# Patient Record
Sex: FEMALE | Race: BLACK OR AFRICAN AMERICAN | Employment: UNEMPLOYED | URBAN - METROPOLITAN AREA
[De-identification: names, ages, dates, MRNs, and addresses within clinical notes are randomized per-mention and may not be internally consistent; named-entity substitution may affect disease eponyms.]

---

## 2017-02-09 ENCOUNTER — CLINICAL SUPPORT (OUTPATIENT)
Dept: FAMILY MEDICINE CLINIC | Age: 47
End: 2017-02-09

## 2017-02-09 DIAGNOSIS — E66.9 OBESITY, UNSPECIFIED OBESITY SEVERITY, UNSPECIFIED OBESITY TYPE: Primary | ICD-10-CM

## 2017-02-09 NOTE — PROGRESS NOTES
Patient attended a Medically Supervised Weight Loss New Patient Orientation today where we discussed:  - New Direction Very Low Calorie Diet details  - Medical Supervision  - Nutrition education  - Cost  - Policies and compliance required for program enrollment. - Lab slip was given to patient with instructions for having them drawn. Patients initial consultation with physician is tentatively scheduled for:  Future Appointments  Date Time Provider Isabella Sapp   2/23/2017 11:00 AM Grace Lewis,  [facility] starting weight was documented as: There were no vitals taken for this visit. [facility]  Metabolic   [facility]  Medically Supervised [facility] Loss Program  [address], [address]  [phone]  office  [phone]  Fax  [email or web address]  [email or web address]

## 2017-02-17 ENCOUNTER — OFFICE VISIT (OUTPATIENT)
Dept: SURGERY | Age: 47
End: 2017-02-17

## 2017-02-17 VITALS
DIASTOLIC BLOOD PRESSURE: 104 MMHG | TEMPERATURE: 97.8 F | SYSTOLIC BLOOD PRESSURE: 163 MMHG | WEIGHT: 281 LBS | BODY MASS INDEX: 56.65 KG/M2 | HEIGHT: 59 IN | HEART RATE: 91 BPM

## 2017-02-17 DIAGNOSIS — K21.9 GERD WITH APNEA: ICD-10-CM

## 2017-02-17 DIAGNOSIS — R06.81 GERD WITH APNEA: ICD-10-CM

## 2017-02-17 DIAGNOSIS — E66.01 MORBID OBESITY DUE TO EXCESS CALORIES (HCC): Primary | ICD-10-CM

## 2017-02-17 RX ORDER — TRIAMTERENE AND HYDROCHLOROTHIAZIDE 37.5; 25 MG/1; MG/1
CAPSULE ORAL DAILY
COMMUNITY
End: 2017-06-15

## 2017-02-17 RX ORDER — HYDROCODONE BITARTRATE AND ACETAMINOPHEN 5; 325 MG/1; MG/1
TABLET ORAL
COMMUNITY
End: 2017-06-15

## 2017-02-17 NOTE — PROGRESS NOTES
Patient seen and examined with Diana Robles PA-C. Patient has super morbid obesity and multiple comorbidities and would benefit from bariatric surgery. Have discussed with patient that given her medical problems and severity of GERD coupled iwht her propensity for eating sweets she would be best served by gastric bypass. She is predominantly interested in sleeve gastrectomy, but is willing to continue to consider her options. She will begin the program and will followup with us for her midtrial at which time she will make her decision.

## 2017-02-17 NOTE — PROGRESS NOTES
Initial Consultation for Bariatric Surgery     Rashi Spears is a 48 y.o. female who comes into the office today for initial consultation for the surgical options for the treatment of morbid obesity. The patient initially identified obesity in elementary school. Rashi Spears has tried a variety of unsupervised weight-loss attempts including [facility] and lost 26 pounds, but has yet to meet with lasting success. Maximum weight lost on a diet is about 30 lbs, but that the weight always seems to return. Today, the patient is Height: 4' 11\" (149.9 cm) , Weight: 127.5 kg (281 lb) for a BMI of Body mass index is 56.76 kg/(m^2). [facility] Sink Maximum weight is 293. It is due to their severe obesity, which is further complicated by weight relate arthropathy, hypertension, GERD and JUSTIN that the patient is now seeking out bariatric surgery. Weight Loss Metrics 2/17/2017 2/17/2017   Pre op / Initial Wt 281 -   Today's Wt - 281 lb   BMI - 56.76 kg/m2   Ideal Body Wt 117 -   Excess Body Wt 164 -   Goal Wt 150 -   Wt loss to date 0 -   % Wt Loss 0 -   80% .2 -       Body mass index is 56.76 kg/(m^2). Past Medical History   Diagnosis Date    Hypertension     Musculoskeletal disorder     Osteoarthritis        Past Surgical History   Procedure Laterality Date    Hx cholecystectomy      Hx gyn         Current Outpatient Prescriptions   Medication Sig Dispense Refill    triamterene-hydroCHLOROthiazide (DYAZIDE) 37.5-25 mg per capsule Take  by mouth daily.  HYDROcodone-acetaminophen (NORCO) 5-325 mg per tablet Take  by mouth.          No Known Allergies    Social History   Substance Use Topics    Smoking status: Former Smoker     Types: Cigarettes     Quit date: 2/17/1978    Smokeless tobacco: Not on file    Alcohol use Yes      Comment: Socially       Family History   Problem Relation Age of Onset    Psychiatric Disorder Mother     Asthma Mother     Heart Disease Mother     Hypertension Mother     Diabetes Father        ROS, positive where in bold:    General: fevers, chills, night sweats, fatigue, weight loss, weight gain    GI: abdominal pain, nausea, vomiting, change in bowel habits, hematochezia, melena, GERD, constipation    Integumentary: dermatitis or abnormal moles.     HEENT:  visual changes, vertigo, epistaxis, dysphagia, hoarseness    Cardiac: chest pain, palpitations, hypertension, edema, varicosities    Resp:  cough, shortness of breath with walking, wheezing, hemoptysis, snoring, reactive airway disease    : hematuria, dysuria, frequency, urgency, nocturia 1x/night, stress urinary incontinence    MSK: weakness, joint pain, osteoarthritis    Endocrine: diabetes, thyroid disease, polyuria, polydipsia, polyphagia, poor wound healing, heat intolerance,cold intolerance    Lymph/Heme: anemia, bruising, history of blood transfusions    Neuro: dizziness, headache, fainting, seizures, stroke    Psychiatry:  anxiety, depression, psychosis      Physical Exam:  Visit Vitals    BP (!) 163/104    Pulse 91    Temp 97.8 °F (36.6 °C)    Ht 4' 11\" (1.499 m)    Wt 127.5 kg (281 lb)    LMP  (Approximate)    BMI 56.76 kg/m2       General: Well developed, well nourished 48 y.o. female in no acute distress  ENMT: normocephalic, atraumatic  Neck: supple, no masses, no adenopathy or carotid bruits, trachea midline  Skin: warm, smooth, dry and well perfused  Respiratory: clear to auscultation bilaterally, no wheeze, rhonchi or rales, excursions normal and symmetrical  Cardiovascular: Regular rate and rhythm, no murmurs, clicks, gallops or rubs, no edema or varicosities  Gastrointestinal: soft, nontender, nondistended, normoactive bowel sounds, no hernias, minimally palpable costal margins, gynecoid distribution  Musculoskeletal: warm, well-perfused, no tenderness or swelling, limping gait  Neuro: sensation and strength grossly intact and symmetrical  Psych: alert and oriented to person, place and time      Impression:    Marie Velasco is a 48 y.o. female who is suffering from morbid obesity and its attendant comorbidities who would benefit from bariatric surgery. We have had an extensive discussion with regard to the risks, benefits and likely outcomes of both the sleeve and the gastric bypass. With regard to bypass, we have discussed the risks including bleeding, infection, need for reoperation, damage to surrounding structure, anastomotic leak, gastrogastric fistula ulcer and stricture, bowel obstruction due to internal hernia or adhesions, DVT, PE, heart attack, stroke and death. Patient also understands risks of inadequate weight loss, excess weight loss, vitamin insufficiency, protein malnutrition, excess skin, and loss of hair. We've discussed the restrictive nature of the sleeve gastrectomy. The patient understands the likelihood of losing approximately 65% of their excess weight in 12 to 18 months. Patient also understands the risks including but not limited to bleeding, infection, need for reoperation, leaks from staple line and strictures, bowel obstruction secondary to adhesions, DVT, PE, heart attack, stroke, and death. Patient also understands risks of inadequate weight loss, excess weight loss, vitamin insufficiency, protein malnutrition, excess skin, and loss of hair. We have reviewed the components of a successful postoperative course including requirement for a high protein, low carbohydrate diet, 60 oz a day of zero calorie liquids, daily vitamin supplementation, daily exercise, regular follow-up, and participation in support groups.  At this time we will enroll the patient in our bariatric program, undertake routine laboratory and radiographic evaluation, EKG, evaluation by nutritionist as well as psychologist.

## 2017-02-23 ENCOUNTER — HOSPITAL ENCOUNTER (OUTPATIENT)
Dept: GENERAL RADIOLOGY | Age: 47
Discharge: HOME | End: 2017-02-23
Attending: PHYSICIAN ASSISTANT
Payer: COMMERCIAL

## 2017-02-23 ENCOUNTER — HOSPITAL ENCOUNTER (OUTPATIENT)
Dept: LAB | Age: 47
Discharge: HOME | End: 2017-02-23
Attending: PHYSICIAN ASSISTANT
Payer: COMMERCIAL

## 2017-02-23 ENCOUNTER — DOCUMENTATION ONLY (OUTPATIENT)
Dept: SURGERY | Age: 47
End: 2017-02-23

## 2017-02-23 ENCOUNTER — HOSPITAL ENCOUNTER (OUTPATIENT)
Dept: OTHER | Age: 47
Discharge: HOME | End: 2017-02-23
Attending: PHYSICIAN ASSISTANT
Payer: COMMERCIAL

## 2017-02-23 ENCOUNTER — HOSPITAL ENCOUNTER (OUTPATIENT)
Dept: PREADMISSION TESTING | Age: 47
Discharge: HOME | End: 2017-02-23
Attending: PHYSICIAN ASSISTANT
Payer: COMMERCIAL

## 2017-02-23 DIAGNOSIS — K21.9 GERD WITH APNEA: ICD-10-CM

## 2017-02-23 DIAGNOSIS — R06.81 GERD WITH APNEA: ICD-10-CM

## 2017-02-23 DIAGNOSIS — E66.01 MORBID OBESITY DUE TO EXCESS CALORIES (HCC): ICD-10-CM

## 2017-02-23 LAB
25(OH)D3 SERPL-MCNC: 16.7 NG/ML (ref 30–100)
ALBUMIN SERPL BCP-MCNC: 4.1 G/DL (ref 3.4–5)
ALBUMIN/GLOB SERPL: 1.1 {RATIO} (ref 0.8–1.7)
ALP SERPL-CCNC: 46 U/L (ref 45–117)
ALT SERPL-CCNC: 38 U/L (ref 13–56)
ANION GAP BLD CALC-SCNC: 8 MMOL/L (ref 3–18)
AST SERPL W P-5'-P-CCNC: 26 U/L (ref 15–37)
ATRIAL RATE: 70 BPM
BASOPHILS # BLD AUTO: 0 K/UL (ref 0–0.06)
BASOPHILS # BLD: 0 % (ref 0–2)
BILIRUB SERPL-MCNC: 0.2 MG/DL (ref 0.2–1)
BUN SERPL-MCNC: 18 MG/DL (ref 7–18)
BUN/CREAT SERPL: 20 (ref 12–20)
CALCIUM SERPL-MCNC: 9 MG/DL (ref 8.5–10.1)
CALCULATED P AXIS, ECG09: 60 DEGREES
CALCULATED R AXIS, ECG10: 96 DEGREES
CALCULATED T AXIS, ECG11: 34 DEGREES
CHLORIDE SERPL-SCNC: 107 MMOL/L (ref 100–108)
CHOLEST SERPL-MCNC: 167 MG/DL
CO2 SERPL-SCNC: 26 MMOL/L (ref 21–32)
CREAT SERPL-MCNC: 0.89 MG/DL (ref 0.6–1.3)
DIAGNOSIS, 93000: NORMAL
DIFFERENTIAL METHOD BLD: ABNORMAL
EOSINOPHIL # BLD: 0 K/UL (ref 0–0.4)
EOSINOPHIL NFR BLD: 0 % (ref 0–5)
ERYTHROCYTE [DISTWIDTH] IN BLOOD BY AUTOMATED COUNT: 13.6 % (ref 11.6–14.5)
FERRITIN SERPL-MCNC: 116 NG/ML (ref 8–388)
FOLATE SERPL-MCNC: 17.1 NG/ML (ref 3.1–17.5)
GLOBULIN SER CALC-MCNC: 3.8 G/DL (ref 2–4)
GLUCOSE SERPL-MCNC: 92 MG/DL (ref 74–99)
HBA1C MFR BLD: 6 % (ref 4.2–5.6)
HCT VFR BLD AUTO: 38.8 % (ref 35–45)
HDLC SERPL-MCNC: 58 MG/DL (ref 40–60)
HDLC SERPL: 2.9 {RATIO} (ref 0–5)
HGB BLD-MCNC: 12.7 G/DL (ref 12–16)
IRON SERPL-MCNC: 60 UG/DL (ref 50–175)
LDLC SERPL CALC-MCNC: 87 MG/DL (ref 0–100)
LIPID PROFILE,FLP: NORMAL
LYMPHOCYTES # BLD AUTO: 54 % (ref 21–52)
LYMPHOCYTES # BLD: 3.3 K/UL (ref 0.9–3.6)
MCH RBC QN AUTO: 30.8 PG (ref 24–34)
MCHC RBC AUTO-ENTMCNC: 32.7 G/DL (ref 31–37)
MCV RBC AUTO: 93.9 FL (ref 74–97)
MONOCYTES # BLD: 0.3 K/UL (ref 0.05–1.2)
MONOCYTES NFR BLD AUTO: 6 % (ref 3–10)
NEUTS SEG # BLD: 2.5 K/UL (ref 1.8–8)
NEUTS SEG NFR BLD AUTO: 40 % (ref 40–73)
P-R INTERVAL, ECG05: 132 MS
PLATELET # BLD AUTO: 215 K/UL (ref 135–420)
PMV BLD AUTO: 11.2 FL (ref 9.2–11.8)
POTASSIUM SERPL-SCNC: 3.7 MMOL/L (ref 3.5–5.5)
PROT SERPL-MCNC: 7.9 G/DL (ref 6.4–8.2)
Q-T INTERVAL, ECG07: 378 MS
QRS DURATION, ECG06: 68 MS
QTC CALCULATION (BEZET), ECG08: 408 MS
RBC # BLD AUTO: 4.13 M/UL (ref 4.2–5.3)
SODIUM SERPL-SCNC: 141 MMOL/L (ref 136–145)
TRIGL SERPL-MCNC: 110 MG/DL (ref ?–150)
TSH SERPL DL<=0.05 MIU/L-ACNC: 1.54 UIU/ML (ref 0.36–3.74)
VENTRICULAR RATE, ECG03: 70 BPM
VIT B12 SERPL-MCNC: 738 PG/ML (ref 211–911)
VLDLC SERPL CALC-MCNC: 22 MG/DL
WBC # BLD AUTO: 6.1 K/UL (ref 4.6–13.2)

## 2017-02-23 PROCEDURE — 74241 XR UPPER GI SERIES W KUB: CPT

## 2017-02-23 PROCEDURE — 80061 LIPID PANEL: CPT | Performed by: PHYSICIAN ASSISTANT

## 2017-02-23 PROCEDURE — 80053 COMPREHEN METABOLIC PANEL: CPT | Performed by: PHYSICIAN ASSISTANT

## 2017-02-23 PROCEDURE — 86677 HELICOBACTER PYLORI ANTIBODY: CPT | Performed by: PHYSICIAN ASSISTANT

## 2017-02-23 PROCEDURE — 93005 ELECTROCARDIOGRAM TRACING: CPT

## 2017-02-23 PROCEDURE — 85025 COMPLETE CBC W/AUTO DIFF WBC: CPT | Performed by: PHYSICIAN ASSISTANT

## 2017-02-23 PROCEDURE — 74011000250 HC RX REV CODE- 250: Performed by: PHYSICIAN ASSISTANT

## 2017-02-23 PROCEDURE — 82728 ASSAY OF FERRITIN: CPT | Performed by: PHYSICIAN ASSISTANT

## 2017-02-23 PROCEDURE — 74011000255 HC RX REV CODE- 255: Performed by: PHYSICIAN ASSISTANT

## 2017-02-23 PROCEDURE — 71020 XR CHEST PA LAT: CPT

## 2017-02-23 PROCEDURE — 82306 VITAMIN D 25 HYDROXY: CPT | Performed by: PHYSICIAN ASSISTANT

## 2017-02-23 PROCEDURE — 84443 ASSAY THYROID STIM HORMONE: CPT | Performed by: PHYSICIAN ASSISTANT

## 2017-02-23 PROCEDURE — 83540 ASSAY OF IRON: CPT | Performed by: PHYSICIAN ASSISTANT

## 2017-02-23 PROCEDURE — 36415 COLL VENOUS BLD VENIPUNCTURE: CPT | Performed by: PHYSICIAN ASSISTANT

## 2017-02-23 PROCEDURE — 84425 ASSAY OF VITAMIN B-1: CPT | Performed by: PHYSICIAN ASSISTANT

## 2017-02-23 PROCEDURE — 83036 HEMOGLOBIN GLYCOSYLATED A1C: CPT | Performed by: PHYSICIAN ASSISTANT

## 2017-02-23 PROCEDURE — 82607 VITAMIN B-12: CPT | Performed by: PHYSICIAN ASSISTANT

## 2017-02-23 RX ADMIN — ANTACID/ANTIFLATULENT 4 G: 380; 550; 10; 10 GRANULE, EFFERVESCENT ORAL at 08:01

## 2017-02-23 RX ADMIN — BARIUM SULFATE 135 ML: 980 POWDER, FOR SUSPENSION ORAL at 08:01

## 2017-02-23 RX ADMIN — BARIUM SULFATE 176 G: 960 POWDER, FOR SUSPENSION ORAL at 08:00

## 2017-02-23 NOTE — PROGRESS NOTES
[facility]  [address] [facility] [address] [facility] [address], [address] [place]    Patient's Name: Tuan Olsen                                  Age: 48 y.o. YOB: 1970                                          Sex: female  Date: 2/23/2017  Insurance: [employer]                          Session: 1 of 4               Surgeon:  Dr. Rad Hernandez    Height: 4'11\"                    Weight: 278#           Starting weight with surgeon: 281#      Overall Pounds Lost: 3#                      Do you smoke?: no    Alcohol Intake:  Number of drinks at a time:  2  Number of times a week: 1    Class Guidelines    Pt. Understand that if they miss a month, depending on insurance, they may have to start over. Pt. Also understand that the expectation is to lose  Or maintain weight. Weight gain may result in delaying surgery until the weight is off. EATING HABITS AND BEHAVIORS:  Pt. Struggles With:  Liquid calories:   Skipping breakfast: x  Eating foods with a high amount of carbohydrates: x  Eating High fat foods: x  Eating large portions: x  Making poor snack choices:x  Eating out frequently:   Skipping meals: x  Reading labels: x  Drinking >48 oz fluids daily: x  Getting sufficient physical activity/exercise: x  Night time eating/snacking: x  Binge eating:   Eating often, even when not hungry (grazing):   Giving into peer pressure regarding eating/drinking: x  Other:     Each class we spend time discussing the pre-op diet, diet progression and vitamin/mineral requirements as well as the Key Diet Principles. Each class we also cover lowering carbohydrate consumption. Keeping carbohydrates <25 grams per meal and avoiding added sugars is emphasized. Patient is educated on the effects that  carbohydrates and bad fats have on them.       PHYSICAL ACTIVITY/EXERCISE:   Patient's activity is increasing because patient plans to or is:  Walking more: x  Other aerobic activity such as running, swimming, Divina, kickboxing ect.:   Chair exercises: x  Active in resistance training such as weight lifting:   Other:     Each class we spend time discussing the importance of increasing activity. Patient can start with 10 minutes of walking daily or chair exercises and build from there. BEHAVIOR MODIFICATION:  Making modifications to behavior, patient plans to or is:  Eating only when hungry not to treat stress, anger, tiredness or boredom: x  Eating away from TV, computer and phone: x  Eating on a small plate: x  Eats slowly, chewing food well: x  Other: We spend time in each class discussing the importance of breaking bad habits and how to do this. I encourage pt.s to self evaluate and look for those crucial moments in which they are making these poor choices. I recommend a food journal which can help identify when/where//why/who we are with when we compromise and make exceptions that are poor choices. ADDITIONAL INFORMATION:  Specific changes patient made since the last class:      RD's concerns/opinion's:  Patient needs to end some poor eating habits such as snacking on chips, drinking soda and night time eating syndrome.     Ricky Pride RD

## 2017-02-24 LAB — H PYLORI IGG SER IA-ACNC: 6.4 U/ML (ref 0–0.8)

## 2017-02-25 LAB — VIT B1 BLD-SCNC: 98.8 NMOL/L (ref 66.5–200)

## 2017-03-02 ENCOUNTER — DOCUMENTATION ONLY (OUTPATIENT)
Dept: SURGERY | Age: 47
End: 2017-03-02

## 2017-03-15 ENCOUNTER — DOCUMENTATION ONLY (OUTPATIENT)
Dept: SURGERY | Age: 47
End: 2017-03-15

## 2017-03-29 ENCOUNTER — DOCUMENTATION ONLY (OUTPATIENT)
Dept: SURGERY | Age: 47
End: 2017-03-29

## 2017-03-29 NOTE — PROGRESS NOTES
[facility]  [address] [facility], [facility]    Patient's Name: Rafa Villalpando                                  Age: 48 y.o. YOB: 1970                                          Sex: female  Date: 3/29/2017  Insurance: [employer]                          Session: 2 of 4               Surgeon:  Dr. Kwabena Larson    Height: 4'11\"                    Weight: 274#           Starting weight with surgeon: 281#      Overall Pounds Lost: 7#                      Do you smoke?: no    Alcohol Intake:     Number of drinks at a time:  2  Number of times a week: 1    Class Guidelines    Pt. Understand that if they miss a month, depending on insurance, they may have to start over. Pt. Also understand that the expectation is to lose  Or maintain weight. Weight gain may result in delaying surgery until the weight is off. EATING HABITS AND BEHAVIORS:  Pt. Struggles With:  Liquid calories:   Skipping breakfast:   Eating foods with a high amount of carbohydrates:   Eating High fat foods:   Eating large portions: x  Making poor snack choices:  Eating out frequently:   Skipping meals:   Reading labels: x  Drinking >48 oz fluids daily:   Getting sufficient physical activity/exercise: x  Night time eating/snacking:   Binge eating:   Eating often, even when not hungry (grazing):   Giving into peer pressure regarding eating/drinking:   Other:     Each class we spend time discussing the pre-op diet, diet progression and vitamin/mineral requirements as well as the Key Diet Principles. Each class we also cover lowering carbohydrate consumption. Keeping carbohydrates <25 grams per meal and avoiding added sugars is emphasized. Patient is educated on the effects that  carbohydrates and bad fats have on them. PHYSICAL ACTIVITY/EXERCISE:   Patient's activity is increasing because patient plans to or is:  Walking more:    Other aerobic activity such as running, swimming, Divina, kickboxing ect.:   Chair exercises: x  Active in resistance training such as weight lifting:   Other:     Each class we spend time discussing the importance of increasing activity. Patient can start with 10 minutes of walking daily or chair exercises and build from there. BEHAVIOR MODIFICATION:  Making modifications to behavior, patient plans to or is:  Eating only when hungry not to treat stress, anger, tiredness or boredom: x  Eating away from TV, computer and phone: x  Eating on a small plate: x  Eats slowly, chewing food well: x  Other: We spend time in each class discussing the importance of breaking bad habits and how to do this. I encourage pt.s to self evaluate and look for those crucial moments in which they are making these poor choices. I recommend a food journal which can help identify when/where//why/who we are with when we compromise and make exceptions that are poor choices. ADDITIONAL INFORMATION:  Specific changes patient made since the last class:  I'm eating more protein less carbs.     Marcello Mosquera, RD

## 2017-04-20 ENCOUNTER — DOCUMENTATION ONLY (OUTPATIENT)
Dept: SURGERY | Age: 47
End: 2017-04-20

## 2017-04-20 ENCOUNTER — OFFICE VISIT (OUTPATIENT)
Dept: SURGERY | Age: 47
End: 2017-04-20

## 2017-04-20 VITALS
HEIGHT: 59 IN | TEMPERATURE: 96.5 F | HEART RATE: 80 BPM | BODY MASS INDEX: 52.62 KG/M2 | DIASTOLIC BLOOD PRESSURE: 70 MMHG | SYSTOLIC BLOOD PRESSURE: 138 MMHG | WEIGHT: 261 LBS

## 2017-04-20 DIAGNOSIS — E66.01 MORBID OBESITY DUE TO EXCESS CALORIES (HCC): Primary | ICD-10-CM

## 2017-04-20 NOTE — PROGRESS NOTES
Bariatric Preoperative Progress note:    Subjective:     Dionne Grayson is a 48 y.o. female who presents today for followup of their candidacy for bariatric surgery. Since last seen, has been working with Hilda Coello. She has been cutting down on carbs, drinking more water and has joined the gym. She has seen 20lb weight loss. Past Medical History:   Diagnosis Date    Hypertension     Musculoskeletal disorder     Osteoarthritis        Past Surgical History:   Procedure Laterality Date    HX CHOLECYSTECTOMY      HX GYN         Current Outpatient Prescriptions   Medication Sig Dispense Refill    triamterene-hydroCHLOROthiazide (DYAZIDE) 37.5-25 mg per capsule Take  by mouth daily.  HYDROcodone-acetaminophen (NORCO) 5-325 mg per tablet Take  by mouth.          No Known Allergies      Objective:     Physical Exam:  Visit Vitals    /70    Pulse 80    Temp 96.5 °F (35.8 °C)    Ht 4' 11\" (1.499 m)    Wt 118.4 kg (261 lb)    LMP  (Approximate)    BMI 52.72 kg/m2       General: Well developed, well nourished 48 y.o. female in no acute distress  ENMT: normocephalic, atraumatic mouth:clear, no overt lesions, oral mucosa pink and moist  Neck: supple, no masses, no adenopathy or carotid bruits, trachea midline  Skin: warm, smooth, dry and well perfused  Respiratory: clear to auscultation bilaterally, no wheezes, rhonchi or rales, excursions normal and symmetrical  Cardiovascular: Regular rate and rhythm, no murmurs, clicks, gallops or rubs, no edema or varicosities  Gastrointestinal: soft, nontender, nondistended, normoactive bowel sounds, no hernias, no hepatosplenomegaly  Musculoskeletal: warm, well-perfused, no tenderness or swelling, normal gait/station  Neuro: sensation and strength grossly intact and symmetrical  Psych: alert and oriented to person, place and time      Studies to date:    Labs: significant for elevated A1c 6.0, H pylori (treated),     EKG: NSR    CXR: no acute process    Nutritional evaluation: ongoing    UGI: negative for hiatal hernia and reflux    Psychiatric evaluation:good cnadidate for weight loss surgery        Assessment:   Sonu Morgan is a 48 y.o. female who is progressing through the bariatric preoperative evaluation. At this time, they are an appropriate candidate for weight loss surgery. Plan:   -complete remainder of preop evaluation including remaining dietary classes  -Follow up once has completed entirety of weight loss workup to determine next steps.

## 2017-04-20 NOTE — PROGRESS NOTES
[facility]  [address] [place]    Patient's Name: Chicho Quiroz                                  Age: 48 y.o. YOB: 1970                                          Sex: female  Date: 4/20/2017  Insurance: [employer]                          Session: 3 of 4               Surgeon:  Dr. Caridad Herron    Height: 4'11\"                    Weight: 261#           Starting weight with surgeon: 274#      Overall Pounds Lost: 13#            Do you smoke?: no    Alcohol Intake:    Number of drinks at a time:  1-2  Number of times a week: 1    Class Guidelines    Pt. Understand that if they miss a month, depending on insurance, they may have to start over. Pt. Also understand that the expectation is to lose  Or maintain weight. Weight gain may result in delaying surgery until the weight is off. EATING HABITS AND BEHAVIORS:  Pt. Struggles With:  Liquid calories:   Skipping breakfast:   Eating foods with a high amount of carbohydrates:   Eating High fat foods:   Eating large portions:   Making poor snack choices:  Eating out frequently:   Skipping meals:   Reading labels:   Drinking >48 oz fluids daily:   Getting sufficient physical activity/exercise:   Night time eating/snacking:   Binge eating:   Eating often, even when not hungry (grazing):   Giving into peer pressure regarding eating/drinking:   Other:     Each class we spend time discussing the pre-op diet, diet progression and vitamin/mineral requirements as well as the Key Diet Principles. Each class we also cover lowering carbohydrate consumption. Keeping carbohydrates <25 grams per meal and avoiding added sugars is emphasized. Patient is educated on the effects that  carbohydrates and bad fats have on them. PHYSICAL ACTIVITY/EXERCISE:   Patient's activity is increasing because patient plans to or is:  Walking more: x  Other aerobic activity such as running, swimming, Divina, kickboxing [facility] x  Chair exercises: x  Active in resistance training such as weight lifting:   Other:     Each class we spend time discussing the importance of increasing activity. Patient can start with 10 minutes of walking daily or chair exercises and build from there. BEHAVIOR MODIFICATION:  Making modifications to behavior, patient plans to or is:  Eating only when hungry not to treat stress, anger, tiredness or boredom: x  Eating away from TV, computer and phone: x  Eating on a small plate: x  Eats slowly, chewing food well: x  Other: We spend time in each class discussing the importance of breaking bad habits and how to do this. I encourage pt.s to self evaluate and look for those crucial moments in which they are making these poor choices. I recommend a food journal which can help identify when/where//why/who we are with when we compromise and make exceptions that are poor choices. ADDITIONAL INFORMATION:  Specific changes patient made since the last class:  I try not to snack in between meals. Especially if I am not hungry. RD's concerns/opinion':  Patient is doing well through out the WLT.     Parth Barrett RD

## 2017-04-20 NOTE — MR AVS SNAPSHOT
Visit Information Date & Time Provider Department Dept. Phone Encounter #  
 4/20/2017  9:30 AM Aleks Milton MD [facility] [phone] [id number] Your Appointments 4/20/2017 10:30 AM  
NUTRITION COUNSELING with [facility] DIETICIAN [address] ([address]) Appt Note: Class 3 of 4  [id number] [facility] [address] [facility] [address] [place] [address]  
  
    
 5/25/2017  1:00 PM  
NUTRITION COUNSELING with [facility] DIETICIAN [address] ([address]) Appt Note: Class 4 of 4 ; Final Eval  
 [address] [facility] [address] [address]  
[phone]  
  
    
 5/25/2017  2:00 PM  
Follow Up with Aleks Milton MD  
[address] ([address]) Appt Note: Completed Insurance Requirements [id number] [facility] [address]  
  
   
 [address] [facility] [address] [facility] Upcoming Health Maintenance Date Due DTaP/Tdap/Td series (1 - Tdap) 4/27/1989 PAP AKA CERVICAL CYTOLOGY 4/27/1989 INFLUENZA AGE 9 TO ADULT 8/1/2016 Allergies as of 4/20/2017  Review Complete On: 2/17/2017 By: Mary Clements LPN No Known Allergies Current Immunizations  Never Reviewed No immunizations on file. Not reviewed this visit You Were Diagnosed With   
  
 Codes Comments Morbid obesity due to excess calories ([facility] [address]    -  Primary ICD-10-CM: E66.01 
ICD-9-CM: 278.01 Vitals BP Pulse Temp Height(growth percentile) Weight(growth percentile) LMP  
 138/70 80 96.5 °F (35.8 °C) 4' 11\" (1.499 m) 261 lb (118.4 kg) (Approximate) BMI OB Status Smoking Status 52.72 kg/m2 Hysterectomy Former Smoker BMI and BSA Data Body Mass Index Body Surface Area 52.72 kg/m 2 2.22 m 2 Your Updated Medication List  
  
   
This list is accurate as of: 4/20/17 10:04 AM.  Always use your most recent med list.  
  
  
  
  
 HYDROcodone-acetaminophen 5-325 mg per tablet Commonly known as:  Risa Cruise Take  by mouth.  
  
 triamterene-hydroCHLOROthiazide 37.5-25 mg per capsule Commonly known as:  Christobal Mote Take  by mouth daily. Introducing [facility]! Rocio Page introduces [employer] patient portal. Now you can access parts of your medical record, email your doctor's office, and request medication refills online. 1. In your internet browser, go to [email or web address] 2. Click on the First Time User? Click Here link in the Sign In box. You will see the New Member Sign Up page. 3. Enter your [employer] Access Code exactly as it appears below. You will not need to use this code after youve completed the sign-up process. If you do not sign up before the expiration date, you must request a new code. · [employer] Access Code: [email or web address] Expires: 5/18/2017  8:13 AM 
 
4. Enter the last four digits of your Social Security Number (xxxx) and Date of Birth (mm/dd/yyyy) as indicated and click Submit. You will be taken to the next sign-up page. 5. Create a [employer] ID. This will be your [employer] login ID and cannot be changed, so think of one that is secure and easy to remember. 6. Create a [employer] password. You can change your password at any time. 7. Enter your Password Reset Question and Answer. This can be used at a later time if you forget your password. 8. Enter your e-mail address. You will receive e-mail notification when new information is available in [address] 9. Click Sign Up. You can now view and download portions of your medical record. 10. Click the Download Summary menu link to download a portable copy of your medical information. If you have questions, please visit the Frequently Asked Questions section of the [employer] website. Remember, [employer] is NOT to be used for urgent needs. For medical emergencies, dial 911. Now available from your iPhone and Android! Please provide this summary of care documentation to your next provider. Your primary care clinician is listed as Yael Gunn. If you have any questions after today's visit, please call [phone].

## 2017-05-25 ENCOUNTER — DOCUMENTATION ONLY (OUTPATIENT)
Dept: SURGERY | Age: 47
End: 2017-05-25

## 2017-05-25 ENCOUNTER — OFFICE VISIT (OUTPATIENT)
Dept: SURGERY | Age: 47
End: 2017-05-25

## 2017-05-25 VITALS
DIASTOLIC BLOOD PRESSURE: 95 MMHG | HEART RATE: 89 BPM | WEIGHT: 260 LBS | BODY MASS INDEX: 52.41 KG/M2 | TEMPERATURE: 97.5 F | HEIGHT: 59 IN | SYSTOLIC BLOOD PRESSURE: 134 MMHG

## 2017-05-25 DIAGNOSIS — E66.01 MORBID OBESITY DUE TO EXCESS CALORIES (HCC): Primary | ICD-10-CM

## 2017-05-25 NOTE — PROGRESS NOTES
[facility]  [address], [facility]    Patient's Name: Sneha King                                  Age: 50 y.o. YOB: 1970                                          Sex: female  Date: 05/25/2017  Insurance: [employer]                          Session: 4 of 4               Surgeon:  Dr. Tao Crowder    Height: 4'11\"                    Weight: 260#           Starting weight with surgeon: 281#      Overall Pounds Lost: 21#               Do you smoke?: no    Alcohol Intake:   I do not drink at all: VERY RARELY    Class Guidelines    Pt. Understand that if they miss a month, depending on insurance, they may have to start over. Pt. Also understand that the expectation is to lose  Or maintain weight. Weight gain may result in delaying surgery until the weight is off. EATING HABITS AND BEHAVIORS:  Pt. Struggles With:  Liquid calories:   Skipping breakfast:   Eating foods with a high amount of carbohydrates:   Eating High fat foods:   Eating large portions:   Making poor snack choices:  Eating out frequently:   Skipping meals:   Reading labels:   Drinking >48 oz fluids daily:   Getting sufficient physical activity/exercise: X  Night time eating/snacking:   Binge eating:   Eating often, even when not hungry (grazing):   Giving into peer pressure regarding eating/drinking:   Other:     Each class we spend time discussing the pre-op diet, diet progression and vitamin/mineral requirements as well as the Key Diet Principles. Each class we also cover lowering carbohydrate consumption. Keeping carbohydrates <25 grams per meal and avoiding added sugars is emphasized. Patient is educated on the effects that  carbohydrates and bad fats have on them. PHYSICAL ACTIVITY/EXERCISE:   Patient's activity is increasing because patient plans to or is:  Walking more: Other aerobic activity such as running, swimming, Divina, kickboxing ect. : X  WATER AEROBICS. Chair exercises: X  Active in resistance training such as weight lifting:   Other:     Each class we spend time discussing the importance of increasing activity. Patient can start with 10 minutes of walking daily or chair exercises and build from there. BEHAVIOR MODIFICATION:  Making modifications to behavior, patient plans to or is:  Eating only when hungry not to treat stress, anger, tiredness or boredom: X  Eating away from TV, computer and phone: X  Eating on a small plate: X  Eats slowly, chewing food well: X  Other: We spend time in each class discussing the importance of breaking bad habits and how to do this. I encourage pt.s to self evaluate and look for those crucial moments in which they are making these poor choices. I recommend a food journal which can help identify when/where//why/who we are with when we compromise and make exceptions that are poor choices. ADDITIONAL INFORMATION:  Specific changes patient made since the last class:  Continually trying to practice all that I have learned since I have been coming to class. RD's concerns/opinion's:  Patient has done fantastic. She has lost 21# over the WLT. She has been an excellent student. She has been educated on the pre-op and post-op diet and vitamin/mineral protocol. She is cleared from a nutritional perspective.       Jose Alfredo Garcia RD  05/25/2017

## 2017-05-25 NOTE — PROGRESS NOTES
Elsia Joyner is a 50 y.o. female who comes into the office today after completing the entirety of the bariatric preoperative protocol satisfactorily. The patient initially identified obesity in childhood and has been struggling ever since. They have tried a variety of unsupervised weight-loss attempts, but has yet to meet with lasting success. Today, the patient's BMI is Body mass index is 52.51 kg/(m^2). , which reflects severe obesity. It is due to their severe obesity, which is further complicated by hypertension, obstructive sleep apnea - clinical and weight related arthopathies  that the patient is now seeking out bariatric surgery, specifically, the vertical sleeve gastrectomy. Pre op weight: 260  EBW: 143  Wt loss to date: 0     Past Medical History:   Diagnosis Date    Hypertension     Musculoskeletal disorder     Osteoarthritis        Past Surgical History:   Procedure Laterality Date    HX CHOLECYSTECTOMY      HX GYN         Current Outpatient Prescriptions   Medication Sig Dispense Refill    triamterene-hydroCHLOROthiazide (DYAZIDE) 37.5-25 mg per capsule Take  by mouth daily.  HYDROcodone-acetaminophen (NORCO) 5-325 mg per tablet Take  by mouth. No Known Allergies    Social History   Substance Use Topics    Smoking status: Former Smoker     Types: Cigarettes     Quit date: 2/17/1978    Smokeless tobacco: None    Alcohol use Yes      Comment: Socially       Family History   Problem Relation Age of Onset    Psychiatric Disorder Mother     Asthma Mother     Heart Disease Mother     Hypertension Mother     Diabetes Father            ROS, positive where in bold:    General: fevers, chills, night sweats, fatigue, weight loss, weight gain. GI: abdominal pain, nausea, vomiting, change in bowel habits, hematochezia, melena, or GERD. Integumentary: dermatitis or abnormal moles.     HEENT:  visual changes, vertigo, epistaxis, dysphagia, hoarseness    Cardiac: chest pain, palpitations, hypertension, edema,  varicosities    Resp:  cough, shortness of breath, wheezing, hemoptysis, snoring,  reactive airway disease    : hematuria, dysuria, frequency, urgency, nocturia, stress urinary incontinence    MSK: weakness, joint pain,  arthritis    Endocrine: diabetes, thyroid disease, polyuria, polydipsia, polyphagia, poor wound healing, heat intolerance,cold intolerance. Lymph/Heme: anemia, bruising,  history of blood transfusions. Neuro: dizziness, headache, fainting, seizures, stroke.     Psychiatry:  Anxiety, depression, psychosis      Physical Exam:  Visit Vitals    BP (!) 134/95    Pulse 89    Temp 97.5 °F (36.4 °C)    Ht 4' 11\" (1.499 m)    Wt 117.9 kg (260 lb)    LMP  (Approximate)    BMI 52.51 kg/m2       General: Well developed, well nourished 50 y.o. female in no acute distress  ENMT: normocephalic, atraumatic mouth:clear, no overt lesions, oral mucosa pink and moist  Neck: supple, no masses, no adenopathy or carotid bruits, trachea midline  Skin: warm, smooth, dry and well perfused  Respiratory: clear to auscultation bilaterally, no wheeze, rhonchi or rales, excursions normal and symmetrical  Cardiovascular: Regular rate and rhythm, no murmurs, clicks, gallops or rubs, no edema or varicosities  Gastrointestinal: soft, nontender, nondistended, normoactive bowel sounds, no hernias, no hepatosplenomegaly, minimally palpablecostal margins, mixed  distribution   Musculoskeletal: warm, well-perfused, no tenderness or swelling, normal gait/station  Neuro: sensation and strength grossly intact and symmetrical  Psych: alert and oriented to person, place and time        Studies:    Labs: within normal limits except for Vit D low (supplemented)     EKG: without significant abnormalities    UGI: no hiatal hernia or significant reflux    Nutritional evaluation has deemed a good candidate for weight loss surgery    Psychiatric evaluation has deemed a good candidate for weight loss surgery      Impression:  Marcelino Hassan is suffering from morbid obesity and comorbidities who would benefit from bariatric surgery. We've discussed the restrictive nature of the sleeve gastrectomy. The patient understands the likelihood of losing approximately 50-60% of their excess weight in 12 to 18 months. She also understands the risks including but not limited to bleeding, infection, need for reoperation, leaks from staple line and strictures, bowel obstruction secondary to adhesions , DVT, PE, heart attack, stroke, and death. Patient also understands risks of inadequate weight loss, excess weight loss, vitamin insufficiency, protein malnutrition, excess skin, and loss of hair. We have reviewed the components of a successful postoperative course including requirement for a high protein, low carbohydrate diet, 60 oz a day of zero calorie liquids, daily vitamin supplementation, daily exercise, regular follow-up, and participation in support groups. We have reviewed the preoperative liver shrinking clear liquid diet, as well as reviewed any medication changes required while on the clear liquid diet. We will schedule them for laparoscopic vertical sleeve gastrectomy in the near future.

## 2017-05-25 NOTE — PROGRESS NOTES
Ms. Elsa Enriquez is a 50year old female who presents today for a follow up for bariatric weight program. Dicussed her blood pressure and she will talk with her doctor.      BMI:52.51    Goal weight: 145.6

## 2017-06-05 ENCOUNTER — DOCUMENTATION ONLY (OUTPATIENT)
Dept: MEDSURG UNIT | Age: 47
End: 2017-06-05

## 2017-06-05 NOTE — PROGRESS NOTES
Patient attended pre op class with Lorraine and Sri Jaquez. Patient was educated on all pre op instructions below. A copy was provided. All questions were answered  7 day Pre-Op LIQUID Diet    Benefits:  o Reducing intake before surgery will shrink your liver by  depleting glycogen. (a form of stored energy)  o Reduced liver size gives better access to stomach during  surgery; which translates to a safer surgery. o Prevents the \"last supper\" syndrome  o Experiencing weight loss before the procedure encourages  post-operative compliance and \"jump-starts\" weight loss    Specifics:  o Start 7 days before surgery:  ____________  o NO SOLID FOODS!!  o Your surgery will be CANCELED if this diet is not followed!!!  o Minimum of 64oz. of fluid daily, including protein drinks.  o No added sugar or carbonated beverages  o Continue to take all your prescribed medication and your vitamin supplements during this preoperative diet phase. CLEAR LIQUIDS:   Water   Sugar free, non-carbonated beverages (crystal light, propel)   Sugar free popsicles   Sugar free Jell-O   Fat free, reduced sodium broth    Decaffeinated coffee or decaffeinated tea with artificial sweeteners. PROTEIN:   60 grams of protein daily (in liquid supplements)   Pre-made protein drinks   Protein powder added to water    3 gram rule: limit sugar and fat to less than 3 grams per 8oz.  4-6 oz. low fat/low sugar yogurt OR cottage cheese 3-4 times during the week      Following Gastric Bypass surgery you will no longer be able to take NSAIDs (Non-Steriodal Anti-Inflammatory Drugs) EVER again. NSAIDs are the leading cause of stomach ulcers following Gastric Bypass surgery. (Patients having the Gastric Sleeve will not be able to take NSAIDs for 6 weeks following surgery.)      MOST COMMONLY TAKEN    NSAIDs    to be AVOIDED!! 1. Ibuprofen  2. Advil  3. Motrin  4. Excedrin  5. Aspirin  6. Celebrex  7. Naproxen  8. Aleve  9. Voltaren  10. Mobic    Attached is an extensive list of additional NSAIDs that cannot be taken following surgery. Please be sure to review this list when you are being prescribed new medications. This list covers the majority of NSAIDs on the market. Be aware that additional NSAIDs do exist and new medications are being introduced regularly. You must be your own advocate!! Non-Steriodal Anti-Inflammatory Drugs (NSAIDs)  ? The following is a list of NSAIDS that are NEVER to be taken again after Gastric Bypass surgery    Ibuprofen which is also known as : Advil, Advil Childrens, Advil Usama Strength, Advil Liquigel, Advil Migraine, Advil Pediatric, Childrens Ibuprofen Berry, Genpril, IBU, Midol IB, Midol Maximum Strength Cramp Formula, Dolgesic, Motrin Childrens, Motrin IB, Motrin Infant Drops, Motrin Usama Strength, Motrin Migraine Pain, Nuprin, Migraine Liqui-gels, Ibu-Tab 200, Cap-Profen, Tab-Profen, Profen, Ibuprohm, Childrens Elixsure, IB Pro, Vicoprofen, Combunox, A-G Profen, Actiprofen, Addaprin, Advil Infants Concentrated Drops, Caldolor, [place], Q-Profen, Ibifon 600, Ibren, Shrestha, Midol Cramps & Bodyaches, [place], Vita, [place], [place], Uni-Pro, Wal-Profen    Aspirin which has the brand name: Arthritis Pain, Aspergum, Aspir-Low, Aspirin Lite Coat, Minal Aspirin, Bufferin, Easprin, Ecotrin, Empirin, Fasprin, [place], Halfprin, [place] Aspirin, [place] Aspirin, Excedrin    Ketoprofen which is known as: Orudis KT, Oruvail, Actron. Sulindac which is sold as: Clinoril.     Naproxen is one of the most common NSAIDs, and is sold as: Aleve, Naprosyn, EC-Naprosyn, Naprelan, Anaprox, All Day Pain Relief, Aflaxen, All Day Relief, Anaprox-DS, Comfort Pac  With Naproxen,  Aleve Caplet, Aleve Easy Open Arthritis, Aleve Gelcap,  Anaprox-DS, EC-Naprosyn, Leader Naproxen Sodium, Midol Extended Relief, Naprelan 375?, Naprelan 500?, Naprelan 750?, Prevacid NapraPac 375,  Prevacid NapraPac, Naprapac, Vimovo. Etodolac is sold under the brand name: Lodine XL, Lodine. Fenoprofen can be found on the market as: Nalfon, Nalfon 200. Diclofenac sold as: Arthrotec, Cataflam, Voltaren, Cambia, Voltaren-XR, Zipsor. Flurbiprofen sold as: Asaid. Ketorolac is sold under the brand name: Sprix, Toradol, Toradol IM, Toradol IV/IM. Piroxicam is found on the market as: Feldene. Indomethacin known as: Indocin SR, Indocin, Indo-Jaun, Indomethegan, Indocin IV. Mefenamic Acid sold as: Ponstel. Meloxicam is sold under the brand name: Mobic    Nabumetone which is sold as: Relafen. Oxaprozin which has the brand name: Daypro    Ketoprofen which has the brand name: Actron, Orudis KT, Orudis, Oruvail    Famotidine and Ibuprofen can be found as: Duexis    Meclofenamate sold as: Meclomen    Tolmetin which can be found on the marked as: Tolectin, Tolectin 600, Tolectin DS    Salsalate which is sold as: Disalcid. Celecoxib which is sold as: Celebrex      Patients name: ________________________________  Date of surgery: ____________    Pre-op instructions:  1. Shower with the antibacterial soap  the 3 days prior to surgery. 2. Pre-admission testing will contact you 1 week before surgery  3. [facility] will contact you the day before surgery to confirm your surgery time.  Email or call your surgery scheduler if you have not heard from them by 3pm  4. Nothing to eat or drink (NPO) after midnight the night before surgery.  Take any evening medications by 11pm  5. Wear comfortable clothing. 6. Do not bring any valuables. 7. Bring insurance card, prescription card, photo ID and credit card to the hospital.  **Discuss all home medications with your surgeon at you pre-op appointment. Your surgeon will inform you of what medications to stop before surgery and what medications to take the day of surgery.     **STOP all NSAIDS (Ibuprofen, Aleve, Advil, Naprosyn etc.) and Aspirin 7 days before your surgery      Important Information:  1. No lifting over 15 lbs. for 6 weeks post-op  2. No driving for 8-68 days after surgery - must be off pain medication. 3. No smoking EVER again! 4. You will NOT be able to take any Non-Steroidal Anti-inflammatories (NSAIDS), Aspirin or Steroids  a. Bypass/revision patients - EVER again. (Tylenol only)  b. Sleeve patients - 6 weeks after surgery  5. Pulse/temperature- twice daily for 2 weeks post-op   6. Avoid pregnancy for 18 months  7. Key Diet principles:  a. Vitamin/mineral supplements-Martell Complete, Calcium citrate, Vitamin D3, Vitamin B12  b.  Protein supplements - 60-70 grams/day  c. Minimum 64 oz. fluid per day      What to Expect in the Hospital:  Pre-op area:  o Emergency door take elevator 2nd floor  o Arrive 1.5 hours before surgery  o Lovenox (blood thinner)  o Incentive Spirometer  o SCDs  o Sign consent  o Anesthesia  Start IV    Operating Room:    o Gastric bypass: 2- 2 ½ hours  o Sleeve gastrectomy: 1-1 ½ hours  o Revision: 2-3 hours    PACU(Post Anesthesia Care Unit):  o Nasal cannula  o EKG monitor  o Blood pressure cuff  o Pulse Ox  o Vigil Catheter  o SCDs  o No family allowed  o Minimum one hour      2 Central (Day of Surgery):  o Private room  o 1-2 oz. ice chips per hour   o IV Dilaudid  or liquid pain medication as needed for pain  o Discontinue vigil catheter  o Walk within 4 hours  o One family member can spend the night (must 18 years or older)  o Cell phone/computers - OK    2 Central (Post-op Day 1/Post-op Day 2):  o 7am - Gastrograffin swallow study (X-ray) for:  o All sleeve gastrectomy patients  o All revision patients   o Discontinue -nasal cannula and heart rate monitor  o Start bariatric clear liquid diet - water, crystal light, broth, Jell-O and protein supplement  o Slowly increase to 3 oz. clear liquids and 1 oz. protein supplement per hour  o Oral pain medication as needed for pain - communicate with your nurse  o Goal:  48 to 64 ounces, clear liquid per day preferable water  o Discharge instructions/Lovenox self-injection instructions   o WALK & WATER    Post-op Goals:  1. Void within 8 hours of your catheter being removed  2. Pain is well controlled with oral pain medication  3. Nausea is under control/No vomiting  4. Tolerating 3 oz. of clear liquids and 1 oz. protein supplement per hour for 3 consecutive hours. Post-op Follow-up Labs will need to be completed 1-2 weeks BEFORE your 3 month, 6 month and yearly visits. These labs are required and your appointment will be rescheduled if they are not completed. My surgical procedure and post-operative hospital stay has been discussed with me and I have been given the opportunity to ask any questions I may have. Patient Signature: ____________________________  Date: _____________________    THINGS I NEED TO KNOW BEFORE SURGERY  1. How many ounces of fluid will you need to drink every day after surgery? _______________    2. List 3 examples of bariatric clear liquids. ________________________  ________________________  ________________________  3. What is the 3 gram rule?   ______________________________________________________________      4. What is the 30 minute rule?  ______________________________________________________________      5. What are examples of food you will be able to eat on the bariatric soft and moist diet?  _________________________  _________________________  _________________________  6. After surgery I will be able to use a straw. TRUE    FALSE    7. After surgery I will NOT be able to drink carbonated beverages. TRUE    FALSE  8. After surgery I will be able to drink caffeine. TRUE   FALSE    9. What 4 vitamins will you take after surgery?  ______________________           _________________________  _____________________           _________________________  10. How much exercise should you get daily? _________________    11.  How long should it take you to eat a meal? ________________    12. The Calcium I have purchased is: ______________________. This has ________ mg per serving. I will need to take this ________ times a day. 13. The protein drink I have decided on is: ______________. This has _________ grams of protein. I will need to drink ______ of my protein drinks during the full liquid phase and _____ during the soft protein phase. My protein drinks will give me ______ ounces of fluid. 14. After having a sleeve gastrectomy I will not be able to take NSAIDs (Non-Steroidal Anti-inflammatory Drugs) for ______ weeks. 15. After having a gastric bypass I will not be able to take NSAIDs (Non-Steroidal Anti-Inflammatory Drugs) for _____________. PRE-OP CHECKLIST    THINGS TO DO AT MY PRE-OP APPOINTMENT WITH MY SURGEON:  ? Discuss ALL my current medications with my surgeon. Know what medications needs to be stopped before surgery AND what medications need to be taken on the morning of surgery. ? blood pressure/diuretic medications. ? Coumadin, Plavix or other blood thinners    ? Stop the following diabetic medications (if applicable): ____________________________________________________on: ______________  ? Use Regular Insulin (Novolog Pen) according to the following sliding scale: Insulin Sliding Scale  Blood sugar:  Amount of insulin:  Under 150  no insulin  150-200  2 units  201-250  4 units  251-300  6 units  301-350  8 units  351-400  10 units  400 or greater 12 units and call physician [phone]    THINGS TO DO FOLLOWING the PRE-OP CLASS:  ? Read through all information given to me by:  ? My dietitian Whit Apodaca or Bland)  ? Sneha/Lorraine at the Pre-op class    ? Contact my insurance company to find out the out of pocket cost of Lovenox (enoxaparin). $____________      THINGS TO DO BEFORE SURGERY:    ? Start the pre-op liquid diet on: ___________    ?  Stop all NSAIDS (see attached sheet with list of NSAIDs) and Aspirin 7 days before my surgery: ___________  ? Contact my doctor(PCP or surgeon) regarding stopping Coumadin, Plavix or other blood thinners    ? Purchase bariatric clear liquids (Crystal Lite, sugar free Jell-O, broth, sugar free popsicles, protein supplement) and bariatric soft and moist foods (low fat yogurt, cottage cheese, eggs, tuna, fish, chicken) so that Im prepared when I get home from the hospital.     ? Purchase all vitamins that will be required following surgery. 1. Chewable multivitamin - 2 per day(ex: Flintstones)  2. Calcium Citrate - 1500mg (500mg 3 times a day)  3. Vitamin B12 - 1000mcg daily  4. Vitamin D3 - 5000IU daily    ? Create a system to keep track of how many ounces of fluid I am drinking daily. ? Post-op GOAL: 64oz per day    ? Purchase a protein supplement that I like:  ? Brand: ______________    ? Ounces: __________   ?  Grams of protein per serving: _________

## 2017-06-08 RX ORDER — CHOLECALCIFEROL TAB 125 MCG (5000 UNIT) 125 MCG
2000 TAB ORAL DAILY
COMMUNITY

## 2017-06-13 ENCOUNTER — ANESTHESIA EVENT (OUTPATIENT)
Dept: SURGERY | Age: 47
DRG: 621 | End: 2017-06-13
Payer: COMMERCIAL

## 2017-06-13 NOTE — ANESTHESIA PREPROCEDURE EVALUATION
Anesthetic History   No history of anesthetic complications            Review of Systems / Medical History  Patient summary reviewed and pertinent labs reviewed    Pulmonary  Within defined limits                 Neuro/Psych   Within defined limits           Cardiovascular    Hypertension: well controlled              Exercise tolerance: >4 METS     GI/Hepatic/Renal  Within defined limits              Endo/Other        Morbid obesity     Other Findings   Comments:   Risk Factors for Postoperative nausea/vomiting:       History of postoperative nausea/vomiting? NO       Female? YES       Motion sickness? NO       Intended opioid administration for postoperative analgesia? YES      Smoking Abstinence  Current Smoker? NO  Elective Surgery? YES  Seen preoperatively by anesthesiologist or proxy prior to day of surgery? YES  Pt abstained from smoking 24 hours prior to anesthesia?  N/A           Physical Exam    Airway  Mallampati: II  TM Distance: 4 - 6 cm  Neck ROM: normal range of motion   Mouth opening: Normal     Cardiovascular  Regular rate and rhythm,  S1 and S2 normal,  no murmur, click, rub, or gallop  Rhythm: regular  Rate: normal         Dental  No notable dental hx       Pulmonary  Breath sounds clear to auscultation               Abdominal  GI exam deferred       Other Findings            Anesthetic Plan    ASA: 3  Anesthesia type: general          Induction: Intravenous  Anesthetic plan and risks discussed with: Patient

## 2017-06-14 ENCOUNTER — HOSPITAL ENCOUNTER (INPATIENT)
Age: 47
LOS: 1 days | Discharge: HOME | DRG: 621 | End: 2017-06-15
Attending: SURGERY | Admitting: SURGERY
Payer: COMMERCIAL

## 2017-06-14 ENCOUNTER — ANESTHESIA (OUTPATIENT)
Dept: SURGERY | Age: 47
DRG: 621 | End: 2017-06-14
Payer: COMMERCIAL

## 2017-06-14 PROBLEM — E66.01 MORBID (SEVERE) OBESITY DUE TO EXCESS CALORIES (HCC): Status: ACTIVE | Noted: 2017-06-14

## 2017-06-14 LAB
ANION GAP BLD CALC-SCNC: 11 MMOL/L (ref 3–18)
BUN SERPL-MCNC: 16 MG/DL (ref 7–18)
BUN/CREAT SERPL: 18 (ref 12–20)
CALCIUM SERPL-MCNC: 9.4 MG/DL (ref 8.5–10.1)
CHLORIDE SERPL-SCNC: 100 MMOL/L (ref 100–108)
CO2 SERPL-SCNC: 27 MMOL/L (ref 21–32)
CREAT SERPL-MCNC: 0.89 MG/DL (ref 0.6–1.3)
GLUCOSE SERPL-MCNC: 92 MG/DL (ref 74–99)
POTASSIUM SERPL-SCNC: 2.9 MMOL/L (ref 3.5–5.5)
SODIUM SERPL-SCNC: 138 MMOL/L (ref 136–145)

## 2017-06-14 PROCEDURE — 77030036736 HC RELD STPLR ENDOSC J&J -F: Performed by: SURGERY

## 2017-06-14 PROCEDURE — 74011250636 HC RX REV CODE- 250/636

## 2017-06-14 PROCEDURE — 74011000250 HC RX REV CODE- 250

## 2017-06-14 PROCEDURE — 77030010507 HC ADH SKN DERMBND J&J -B

## 2017-06-14 PROCEDURE — 77030036732 HC RELD STPLR VASC J&J -F: Performed by: SURGERY

## 2017-06-14 PROCEDURE — 77030008603 HC TRCR ENDOSC EPATH J&J -C: Performed by: SURGERY

## 2017-06-14 PROCEDURE — 77030036583 HC TRCR CANN BLDLSS OPT MEDT -B: Performed by: SURGERY

## 2017-06-14 PROCEDURE — 74011000250 HC RX REV CODE- 250: Performed by: SURGERY

## 2017-06-14 PROCEDURE — 77030016151 HC PROTCTR LNS DFOG COVD -B: Performed by: SURGERY

## 2017-06-14 PROCEDURE — 76210000006 HC OR PH I REC 0.5 TO 1 HR: Performed by: SURGERY

## 2017-06-14 PROCEDURE — 74011258636 HC RX REV CODE- 258/636: Performed by: SURGERY

## 2017-06-14 PROCEDURE — 0BQR4ZZ REPAIR RIGHT DIAPHRAGM, PERCUTANEOUS ENDOSCOPIC APPROACH: ICD-10-PCS | Performed by: SURGERY

## 2017-06-14 PROCEDURE — 77030018823 HC SLV COMPR VENO -B: Performed by: SURGERY

## 2017-06-14 PROCEDURE — 77030013079 HC BLNKT BAIR HGGR 3M -A: Performed by: ANESTHESIOLOGY

## 2017-06-14 PROCEDURE — 74011250636 HC RX REV CODE- 250/636: Performed by: SURGERY

## 2017-06-14 PROCEDURE — 76010000171 HC OR TIME 2 TO 2.5 HR INTENSV-TIER 1: Performed by: SURGERY

## 2017-06-14 PROCEDURE — 65270000029 HC RM PRIVATE

## 2017-06-14 PROCEDURE — 77030007955 HC PCH ENDOSC SPEC J&J -B: Performed by: SURGERY

## 2017-06-14 PROCEDURE — 0BQS4ZZ REPAIR LEFT DIAPHRAGM, PERCUTANEOUS ENDOSCOPIC APPROACH: ICD-10-PCS | Performed by: SURGERY

## 2017-06-14 PROCEDURE — 77030005515 HC CATH URETH FOL14 BARD -B: Performed by: SURGERY

## 2017-06-14 PROCEDURE — 77030027491 HC SHR ENDOSC COVD -B: Performed by: SURGERY

## 2017-06-14 PROCEDURE — 77030002912 HC SUT ETHBND J&J -A

## 2017-06-14 PROCEDURE — 77030031139 HC SUT VCRL2 J&J -A: Performed by: SURGERY

## 2017-06-14 PROCEDURE — 77030033271 HC TRCR ENDOSC EPATH2 J&J -B: Performed by: SURGERY

## 2017-06-14 PROCEDURE — 0DB64Z3 EXCISION OF STOMACH, PERCUTANEOUS ENDOSCOPIC APPROACH, VERTICAL: ICD-10-PCS | Performed by: SURGERY

## 2017-06-14 PROCEDURE — 74011000258 HC RX REV CODE- 258

## 2017-06-14 PROCEDURE — 77030032490 HC SLV COMPR SCD KNE COVD -B: Performed by: SURGERY

## 2017-06-14 PROCEDURE — 77030002996 HC SUT SLK J&J -A: Performed by: SURGERY

## 2017-06-14 PROCEDURE — 76060000035 HC ANESTHESIA 2 TO 2.5 HR: Performed by: SURGERY

## 2017-06-14 PROCEDURE — 77030008477 HC STYL SATN SLP COVD -A: Performed by: ANESTHESIOLOGY

## 2017-06-14 PROCEDURE — 74011250637 HC RX REV CODE- 250/637: Performed by: NURSE ANESTHETIST, CERTIFIED REGISTERED

## 2017-06-14 PROCEDURE — 77030027138 HC INCENT SPIROMETER -A

## 2017-06-14 PROCEDURE — 77030027876 HC STPLR ENDOSC FLX PWR J&J -G1: Performed by: SURGERY

## 2017-06-14 PROCEDURE — 77030003581 HC NDL INSUF VERES COVD -B

## 2017-06-14 PROCEDURE — 77030020254 HC SOL INJ D5LR LACTATED RINGER

## 2017-06-14 PROCEDURE — 77030002933 HC SUT MCRYL J&J -A: Performed by: SURGERY

## 2017-06-14 PROCEDURE — 77030027138 HC INCENT SPIROMETER -A: Performed by: SURGERY

## 2017-06-14 PROCEDURE — 36415 COLL VENOUS BLD VENIPUNCTURE: CPT | Performed by: NURSE ANESTHETIST, CERTIFIED REGISTERED

## 2017-06-14 PROCEDURE — 74011250637 HC RX REV CODE- 250/637: Performed by: SURGERY

## 2017-06-14 PROCEDURE — 77030008683 HC TU ET CUF COVD -A: Performed by: ANESTHESIOLOGY

## 2017-06-14 PROCEDURE — 88307 TISSUE EXAM BY PATHOLOGIST: CPT | Performed by: SURGERY

## 2017-06-14 PROCEDURE — 80048 BASIC METABOLIC PNL TOTAL CA: CPT | Performed by: NURSE ANESTHETIST, CERTIFIED REGISTERED

## 2017-06-14 PROCEDURE — 77030008437 HC REINF STRP REINF SEMGD WLGO -C: Performed by: SURGERY

## 2017-06-14 RX ORDER — DEXTROSE, SODIUM CHLORIDE, SODIUM LACTATE, POTASSIUM CHLORIDE, AND CALCIUM CHLORIDE 5; .6; .31; .03; .02 G/100ML; G/100ML; G/100ML; G/100ML; G/100ML
150 INJECTION, SOLUTION INTRAVENOUS CONTINUOUS
Status: DISCONTINUED | OUTPATIENT
Start: 2017-06-14 | End: 2017-06-15 | Stop reason: HOSPADM

## 2017-06-14 RX ORDER — HYDROMORPHONE HYDROCHLORIDE 1 MG/ML
INJECTION, SOLUTION INTRAMUSCULAR; INTRAVENOUS; SUBCUTANEOUS AS NEEDED
Status: DISCONTINUED | OUTPATIENT
Start: 2017-06-14 | End: 2017-06-14 | Stop reason: HOSPADM

## 2017-06-14 RX ORDER — FENTANYL CITRATE 50 UG/ML
25 INJECTION, SOLUTION INTRAMUSCULAR; INTRAVENOUS AS NEEDED
Status: DISCONTINUED | OUTPATIENT
Start: 2017-06-14 | End: 2017-06-14 | Stop reason: HOSPADM

## 2017-06-14 RX ORDER — ONDANSETRON 2 MG/ML
4 INJECTION INTRAMUSCULAR; INTRAVENOUS EVERY 6 HOURS
Status: DISCONTINUED | OUTPATIENT
Start: 2017-06-14 | End: 2017-06-15

## 2017-06-14 RX ORDER — DEXTROMETHORPHAN HYDROBROMIDE, GUAIFENESIN 5; 100 MG/5ML; MG/5ML
650 LIQUID ORAL
COMMUNITY
End: 2017-06-15

## 2017-06-14 RX ORDER — MIDAZOLAM HYDROCHLORIDE 1 MG/ML
INJECTION, SOLUTION INTRAMUSCULAR; INTRAVENOUS AS NEEDED
Status: DISCONTINUED | OUTPATIENT
Start: 2017-06-14 | End: 2017-06-14 | Stop reason: HOSPADM

## 2017-06-14 RX ORDER — LOSARTAN POTASSIUM 25 MG/1
TABLET ORAL DAILY
COMMUNITY

## 2017-06-14 RX ORDER — GLYCOPYRROLATE 0.2 MG/ML
INJECTION INTRAMUSCULAR; INTRAVENOUS AS NEEDED
Status: DISCONTINUED | OUTPATIENT
Start: 2017-06-14 | End: 2017-06-14 | Stop reason: HOSPADM

## 2017-06-14 RX ORDER — ENOXAPARIN SODIUM 100 MG/ML
40 INJECTION SUBCUTANEOUS DAILY
Status: DISCONTINUED | OUTPATIENT
Start: 2017-06-15 | End: 2017-06-15 | Stop reason: HOSPADM

## 2017-06-14 RX ORDER — SODIUM CHLORIDE, SODIUM LACTATE, POTASSIUM CHLORIDE, CALCIUM CHLORIDE 600; 310; 30; 20 MG/100ML; MG/100ML; MG/100ML; MG/100ML
75 INJECTION, SOLUTION INTRAVENOUS CONTINUOUS
Status: DISCONTINUED | OUTPATIENT
Start: 2017-06-14 | End: 2017-06-15 | Stop reason: HOSPADM

## 2017-06-14 RX ORDER — LIDOCAINE HYDROCHLORIDE 20 MG/ML
INJECTION, SOLUTION EPIDURAL; INFILTRATION; INTRACAUDAL; PERINEURAL AS NEEDED
Status: DISCONTINUED | OUTPATIENT
Start: 2017-06-14 | End: 2017-06-14 | Stop reason: HOSPADM

## 2017-06-14 RX ORDER — ACETAMINOPHEN 325 MG/1
650 TABLET ORAL
Status: DISCONTINUED | OUTPATIENT
Start: 2017-06-14 | End: 2017-06-15 | Stop reason: HOSPADM

## 2017-06-14 RX ORDER — ENOXAPARIN SODIUM 100 MG/ML
40 INJECTION SUBCUTANEOUS ONCE
Status: COMPLETED | OUTPATIENT
Start: 2017-06-14 | End: 2017-06-14

## 2017-06-14 RX ORDER — DEXAMETHASONE SODIUM PHOSPHATE 4 MG/ML
INJECTION, SOLUTION INTRA-ARTICULAR; INTRALESIONAL; INTRAMUSCULAR; INTRAVENOUS; SOFT TISSUE AS NEEDED
Status: DISCONTINUED | OUTPATIENT
Start: 2017-06-14 | End: 2017-06-14 | Stop reason: HOSPADM

## 2017-06-14 RX ORDER — ROCURONIUM BROMIDE 10 MG/ML
INJECTION, SOLUTION INTRAVENOUS AS NEEDED
Status: DISCONTINUED | OUTPATIENT
Start: 2017-06-14 | End: 2017-06-14 | Stop reason: HOSPADM

## 2017-06-14 RX ORDER — PROPOFOL 10 MG/ML
INJECTION, EMULSION INTRAVENOUS AS NEEDED
Status: DISCONTINUED | OUTPATIENT
Start: 2017-06-14 | End: 2017-06-14 | Stop reason: HOSPADM

## 2017-06-14 RX ORDER — OXYCODONE HYDROCHLORIDE 5 MG/1
5 TABLET ORAL
Status: DISCONTINUED | OUTPATIENT
Start: 2017-06-14 | End: 2017-06-15 | Stop reason: HOSPADM

## 2017-06-14 RX ORDER — CEFAZOLIN SODIUM 2 G/50ML
2 SOLUTION INTRAVENOUS ONCE
Status: COMPLETED | OUTPATIENT
Start: 2017-06-14 | End: 2017-06-14

## 2017-06-14 RX ORDER — FENTANYL CITRATE 50 UG/ML
INJECTION, SOLUTION INTRAMUSCULAR; INTRAVENOUS AS NEEDED
Status: DISCONTINUED | OUTPATIENT
Start: 2017-06-14 | End: 2017-06-14 | Stop reason: HOSPADM

## 2017-06-14 RX ORDER — HYDROMORPHONE HYDROCHLORIDE 1 MG/ML
1 INJECTION, SOLUTION INTRAMUSCULAR; INTRAVENOUS; SUBCUTANEOUS
Status: DISCONTINUED | OUTPATIENT
Start: 2017-06-14 | End: 2017-06-15 | Stop reason: HOSPADM

## 2017-06-14 RX ORDER — TRIAMTERENE/HYDROCHLOROTHIAZID 37.5-25 MG
1 TABLET ORAL DAILY
Status: DISCONTINUED | OUTPATIENT
Start: 2017-06-14 | End: 2017-06-15 | Stop reason: HOSPADM

## 2017-06-14 RX ORDER — CEFAZOLIN SODIUM 2 G/50ML
SOLUTION INTRAVENOUS
Status: DISPENSED
Start: 2017-06-14 | End: 2017-06-14

## 2017-06-14 RX ORDER — SUCCINYLCHOLINE CHLORIDE 20 MG/ML
INJECTION INTRAMUSCULAR; INTRAVENOUS AS NEEDED
Status: DISCONTINUED | OUTPATIENT
Start: 2017-06-14 | End: 2017-06-14 | Stop reason: HOSPADM

## 2017-06-14 RX ORDER — ONDANSETRON 2 MG/ML
4 INJECTION INTRAMUSCULAR; INTRAVENOUS
Status: DISCONTINUED | OUTPATIENT
Start: 2017-06-14 | End: 2017-06-14 | Stop reason: HOSPADM

## 2017-06-14 RX ORDER — HYDROMORPHONE HYDROCHLORIDE 2 MG/ML
0.5 INJECTION, SOLUTION INTRAMUSCULAR; INTRAVENOUS; SUBCUTANEOUS
Status: DISCONTINUED | OUTPATIENT
Start: 2017-06-14 | End: 2017-06-14 | Stop reason: HOSPADM

## 2017-06-14 RX ORDER — ONDANSETRON 2 MG/ML
INJECTION INTRAMUSCULAR; INTRAVENOUS AS NEEDED
Status: DISCONTINUED | OUTPATIENT
Start: 2017-06-14 | End: 2017-06-14 | Stop reason: HOSPADM

## 2017-06-14 RX ORDER — FAMOTIDINE 20 MG/1
20 TABLET, FILM COATED ORAL ONCE
Status: COMPLETED | OUTPATIENT
Start: 2017-06-14 | End: 2017-06-14

## 2017-06-14 RX ORDER — SODIUM CHLORIDE, SODIUM LACTATE, POTASSIUM CHLORIDE, CALCIUM CHLORIDE 600; 310; 30; 20 MG/100ML; MG/100ML; MG/100ML; MG/100ML
50 INJECTION, SOLUTION INTRAVENOUS CONTINUOUS
Status: DISPENSED | OUTPATIENT
Start: 2017-06-14 | End: 2017-06-15

## 2017-06-14 RX ORDER — HYOSCYAMINE SULFATE 0.12 MG/1
0.12 TABLET, ORALLY DISINTEGRATING ORAL
Status: DISCONTINUED | OUTPATIENT
Start: 2017-06-14 | End: 2017-06-15 | Stop reason: HOSPADM

## 2017-06-14 RX ORDER — LOSARTAN POTASSIUM 25 MG/1
25 TABLET ORAL DAILY
Status: DISCONTINUED | OUTPATIENT
Start: 2017-06-15 | End: 2017-06-15 | Stop reason: HOSPADM

## 2017-06-14 RX ORDER — NEOSTIGMINE METHYLSULFATE 5 MG/5 ML
SYRINGE (ML) INTRAVENOUS AS NEEDED
Status: DISCONTINUED | OUTPATIENT
Start: 2017-06-14 | End: 2017-06-14 | Stop reason: HOSPADM

## 2017-06-14 RX ORDER — DIPHENHYDRAMINE HYDROCHLORIDE 50 MG/ML
25 INJECTION, SOLUTION INTRAMUSCULAR; INTRAVENOUS
Status: DISCONTINUED | OUTPATIENT
Start: 2017-06-14 | End: 2017-06-15 | Stop reason: HOSPADM

## 2017-06-14 RX ORDER — INSULIN LISPRO 100 [IU]/ML
INJECTION, SOLUTION INTRAVENOUS; SUBCUTANEOUS ONCE
Status: ACTIVE | OUTPATIENT
Start: 2017-06-14 | End: 2017-06-14

## 2017-06-14 RX ORDER — SODIUM CHLORIDE, SODIUM LACTATE, POTASSIUM CHLORIDE, CALCIUM CHLORIDE 600; 310; 30; 20 MG/100ML; MG/100ML; MG/100ML; MG/100ML
50 INJECTION, SOLUTION INTRAVENOUS CONTINUOUS
Status: DISCONTINUED | OUTPATIENT
Start: 2017-06-14 | End: 2017-06-14 | Stop reason: HOSPADM

## 2017-06-14 RX ADMIN — ONDANSETRON 4 MG: 2 INJECTION INTRAMUSCULAR; INTRAVENOUS at 13:08

## 2017-06-14 RX ADMIN — DEXAMETHASONE SODIUM PHOSPHATE 4 MG: 4 INJECTION, SOLUTION INTRA-ARTICULAR; INTRALESIONAL; INTRAMUSCULAR; INTRAVENOUS; SOFT TISSUE at 08:12

## 2017-06-14 RX ADMIN — PROPOFOL 180 MG: 10 INJECTION, EMULSION INTRAVENOUS at 07:50

## 2017-06-14 RX ADMIN — Medication 4 MG: at 09:43

## 2017-06-14 RX ADMIN — SODIUM CHLORIDE, SODIUM LACTATE, POTASSIUM CHLORIDE, AND CALCIUM CHLORIDE: 600; 310; 30; 20 INJECTION, SOLUTION INTRAVENOUS at 07:43

## 2017-06-14 RX ADMIN — ROCURONIUM BROMIDE 10 MG: 10 INJECTION, SOLUTION INTRAVENOUS at 08:30

## 2017-06-14 RX ADMIN — MIDAZOLAM HYDROCHLORIDE 2 MG: 1 INJECTION, SOLUTION INTRAMUSCULAR; INTRAVENOUS at 07:42

## 2017-06-14 RX ADMIN — SODIUM CHLORIDE 1000 ML: 900 INJECTION, SOLUTION INTRAVENOUS at 10:18

## 2017-06-14 RX ADMIN — FAMOTIDINE 20 MG: 20 TABLET, FILM COATED ORAL at 07:05

## 2017-06-14 RX ADMIN — HYDROMORPHONE HYDROCHLORIDE 0.5 MG: 1 INJECTION, SOLUTION INTRAMUSCULAR; INTRAVENOUS; SUBCUTANEOUS at 08:49

## 2017-06-14 RX ADMIN — ONDANSETRON 4 MG: 2 INJECTION INTRAMUSCULAR; INTRAVENOUS at 08:12

## 2017-06-14 RX ADMIN — SODIUM CHLORIDE, SODIUM LACTATE, POTASSIUM CHLORIDE, CALCIUM CHLORIDE, AND DEXTROSE MONOHYDRATE 150 ML/HR: 600; 310; 30; 20; 5 INJECTION, SOLUTION INTRAVENOUS at 13:26

## 2017-06-14 RX ADMIN — CEFAZOLIN SODIUM 2 G: 2 SOLUTION INTRAVENOUS at 07:55

## 2017-06-14 RX ADMIN — SUCCINYLCHOLINE CHLORIDE 100 MG: 20 INJECTION INTRAMUSCULAR; INTRAVENOUS at 07:50

## 2017-06-14 RX ADMIN — ACETAMINOPHEN 650 MG: 325 TABLET, FILM COATED ORAL at 14:50

## 2017-06-14 RX ADMIN — HYDROMORPHONE HYDROCHLORIDE 0.5 MG: 1 INJECTION, SOLUTION INTRAMUSCULAR; INTRAVENOUS; SUBCUTANEOUS at 09:32

## 2017-06-14 RX ADMIN — FENTANYL CITRATE 100 MCG: 50 INJECTION, SOLUTION INTRAMUSCULAR; INTRAVENOUS at 07:44

## 2017-06-14 RX ADMIN — ENOXAPARIN SODIUM 40 MG: 40 INJECTION SUBCUTANEOUS at 07:04

## 2017-06-14 RX ADMIN — GLYCOPYRROLATE 0.3 MG: 0.2 INJECTION INTRAMUSCULAR; INTRAVENOUS at 09:43

## 2017-06-14 RX ADMIN — ROCURONIUM BROMIDE 10 MG: 10 INJECTION, SOLUTION INTRAVENOUS at 08:59

## 2017-06-14 RX ADMIN — SODIUM CHLORIDE, SODIUM LACTATE, POTASSIUM CHLORIDE, CALCIUM CHLORIDE, AND DEXTROSE MONOHYDRATE 150 ML/HR: 600; 310; 30; 20; 5 INJECTION, SOLUTION INTRAVENOUS at 21:02

## 2017-06-14 RX ADMIN — ROCURONIUM BROMIDE 10 MG: 10 INJECTION, SOLUTION INTRAVENOUS at 08:22

## 2017-06-14 RX ADMIN — ROCURONIUM BROMIDE 30 MG: 10 INJECTION, SOLUTION INTRAVENOUS at 08:02

## 2017-06-14 RX ADMIN — LIDOCAINE HYDROCHLORIDE 100 MG: 20 INJECTION, SOLUTION EPIDURAL; INFILTRATION; INTRACAUDAL; PERINEURAL at 07:50

## 2017-06-14 NOTE — INTERVAL H&P NOTE
H&P Update:  Elisa Joyner was seen and examined. History and physical has been reviewed. The patient has been examined. There have been no significant clinical changes since the completion of the originally dated History and Physical.  Patient identified by surgeon; surgical site was confirmed by patient and surgeon.     Signed By: Nataly Medina MD     June 14, 2017 7:36 AM

## 2017-06-14 NOTE — PROGRESS NOTES
[facility]   Discharge Planning/ Assessment    Reasons for Intervention:     High Risk Criteria  [x] Yes  []No   Physician Referral  [] Yes  [x]No        Date    Nursing Referral  [] Yes  [x]No        Date    Patient/Family Request  [] Yes  [x]No        Date       Resources:    Medicare  [] Yes  [x]No   Medicaid  [] Yes  [x]No   No Resources  [] Yes  [x]No   Private Insurance  [x] Yes  []No    Name/Phone Number    Other  [] Yes  [x]No        (i.e. Workman's Comp)         Prior Services:    Prior Services  [] Yes  [x]No   Home Health  [] Yes  [x]No   [address]  [] Yes  [x]No        Number of Hours    Home Care Program  [] Yes  [x]No       Meals on Wheels  [] Yes  [x]No   Office on Aging  [] Yes  [x]No   Transportation Services  [] Yes  [x]No   Nursing Home  [] Yes  [x]No        Nursing Home Name    [facility]  [] Yes  [x]No        [address] Name    Other       Information Source:      Information obtained from  [] Patient  [] Parent   [] [address]  [] Child  [] Spouse   [] Significant Other/Partner   [] Friend      [] EMS    [] Nursing Home Chart          [x] Other:chart   Chart Review  [x] Yes  []No     Family/Support System:    Patient lives with  [] Alone    [x] Spouse   [] Significant Other  [] Children  [] Caretaker   [] Parent  [] Sibling     [] Other       Other Support System:    Is the patient responsible for care of others  [] Yes  [x]No   Information of person caring for patient on  discharge self   Managers financial affairs independently  [x] Yes  []No   If no, explain:      Status Prior to Admission:    Mental Status  [x] Awake  [x] Alert  [x] Oriented  [] Quiet/Calm [] Lethargic/Sedated   [] Disoriented  [] Restless/Anxious  [] Combative   Personal Care  [] Dependent  [x] [address]  [] Requires Assistance   Meal Preparation Ability  [x] Independent   [] Standby Assistance   [] Minimal Assistance   [] Moderate Assistance  [] Maximum Assistance     [] Total Assistance   Chores  [x] Independent with Chores   [] [address] Resident   [] Requires Assistance   Bowel/Bladder  [x] Continent  [] Catheter  [] Incontinent  [] Ostomy Self-Care    [] Urine Diversion Self-Care  [] Maximum Assistance     [] Total Assistance   Number of Persons needed for assistance    DME at home  [] Wheatley Benjaminlinda  [] Jadine Stagers, Straight   [] Commode    [] Bathroom/Grab Bars  [] Hospital Bed  [] Nebulizer  [] Oxygen           [] Raised Toilet Seat  [] Shower Chair  [] Side Rails for Bed   [] Tub Transfer Bench   [] Fraga Ear  [] Serafin Pat, Standard      [] Other:   Vendor      Treatment Presently Receiving:    Current Treatments  [] Chemotherapy  [] Dialysis  [] Insulin  [] IVAB [x] IVF   [] O2  [] PCA   [] PT   [] RT   [] Tube Feedings   [] Wound Care     Psychosocial Evaluation:    Verbalized Knowledge of Disease Process  [x] Patient  [x]Family   Coping with Disease Process  [x] Patient  [x]Family   Requires Further Counseling Coping with Disease Process  [] Patient  []Family     Identified Projected Needs:    Home Health Aid  [] Yes  [x]No   Transportation  [] Yes  [x]No   Education  [] Yes  [x]No        Specific Education     Financial Counseling  [] Yes  [x]No   Inability to Care for Self/Will Require 24 hour care  [] Yes  [x]No   Pain Management  [] Yes  [x]No   Home Infusion Therapy  [] Yes  [x]No   Oxygen Therapy  [] Yes  [x]No   DME  [] Yes  [x]No   Long Term Care Placement  [] Yes  [x]No   Rehab  [] Yes  [x]No   Physical Therapy  [] Yes  [x]No   Needs Anticipated At This Time  [] Yes  [x]No     Intra-Hospital Referral:    [facility]  [] Yes  [x]No     [] Yes  [x]No   Patient Representative  [] Yes  [x]No   Staff for Teaching Needs  [] Yes  [x]No   Specialty Teaching Needs     Diabetic Educator  [] Yes  [x]No   Referral for Diabetic Educator Needed  [] Yes  [x]No  If Yes, place order for Nutritionist or Diabetic Consult Tentative Discharge Plan:    Home with No Services  [x] Yes  []No   Home with [address]  [] Yes  [x]No        If Yes, specify type    [address]  [] Yes  [x]No        If Yes, specify type    Meals on Wheels  [] Yes  [x]No   Office of Aging  [] Yes  [x]No   NHP  [] Yes  [x]No   Return to the Nursing Home  [] Yes  [x]No   Rehab Therapy  [] Yes  [x]No   Acute Rehab  [] Yes  [x]No   Subacute Rehab  [] Yes  [x]No   Private Care  [] Yes  [x]No   Substance Abuse Referral  [] Yes  [x]No   Transportation  [] Yes  [x]No   Chore Service  [] Yes  [x]No   Inpatient Hospice  [] Yes  [x]No   OP RT  [] Yes  [x] No   OP Hemo  [] Yes  [x] No   OP PT  [] Yes  [x]No   Support Group  [] Yes  [x]No   Reach to Recovery  [] Yes  [x]No   OP Oncology Clinic  [] Yes  [x]No   Clinic Appointment  [] Yes  [x]No   DME  [] Yes  [x]No   Comments    Name of D/C Planner or  Given to Patient or Family Claudette Moreira, ALBIN   Phone Number [phone] extension [phone]   Date June 14, 2017   Time 357 pm   If you are discharged home, whom do you designate to participate in your discharge plan and receive any information needed?      Enter name of Darryle Comes  spouse        Phone # of [address]        Address of [facility] John [address] [place] [address], [address] [place] [address]        Updated June 14, 2017        Patient refused to designate any           individual

## 2017-06-14 NOTE — PERIOP NOTES
Rec'd care of pt from OR via bed. Resp even and unlabored. Attached to monitor. VSS. OR, MAR and anesthesia report acknowledged. Will cont to monitor.

## 2017-06-14 NOTE — IP AVS SNAPSHOT
[address] Patient: Jasmeet Heller MRN: [id number] :1970 You are allergic to the following No active allergies Recent Documentation Height Weight BMI OB Status Smoking Status 1.499 m 117.9 kg 52.51 kg/m2 Hysterectomy Former Smoker Emergency Contacts Name Discharge Info Relation Home Work Mobile Esa De La Cruz DISCHARGE CAREGIVER [3] Spouse [3]   [phone] About your hospitalization You were admitted on:  2017 You last received care in the:  [address] [place] [address],[address] You were discharged on:  Leelee 15, 2017 Unit phone number:  [phone] Why you were hospitalized Your primary diagnosis was:  Not on File Your diagnoses also included: Morbid (Severe) Obesity Due To Excess Calories (Hcc) Providers Seen During Your Hospitalizations Provider Role Specialty Primary office phone German Mensah MD Attending Provider General Surgery [phone] Your Primary Care Physician (PCP) Primary Care Physician Office Phone Office Fax Cecilio [place], [address] [place] [address] [phone] Follow-up Information None Your Appointments 2017  1:45 PM EDT  
PRE OP with German Mensah MD  
[address] [place] [address]) [address] [facility] [address] [address]  
[phone] 2017  2:00 PM EDT NUTRITION COUNSELING with [facility] DIETICIAN [address] [facility] [address] [facility] [address] [address]  
[phone] Current Discharge Medication List  
  
START taking these medications Dose & Instructions Dispensing Information Comments  Morning Noon Evening Bedtime  
 enoxaparin 40 mg/0.4 mL  
 Commonly known as:  LOVENOX Your last dose was: Your next dose is:    
   
   
 Dose:  40 mg  
0.4 mL by SubCUTAneous route daily. Quantity:  7 Syringe Refills:  0  
     
   
   
   
  
 hyoscyamine sulfate 0.125 mg tablet Commonly known as:  CYSTOSPAZ Your last dose was: Your next dose is:    
   
   
 Dose:  0.125 mg  
1 Tab by SubLINGual route every six (6) hours as needed for Other (Spasms). Quantity:  30 Tab Refills:  0  
     
   
   
   
  
 ondansetron 4 mg disintegrating tablet Commonly known as:  ZOFRAN ODT Your last dose was: Your next dose is:    
   
   
 Dose:  4 mg Take 1 Tab by mouth every six (6) hours as needed. Quantity:  30 Tab Refills:  0  
     
   
   
   
  
 oxyCODONE IR 5 mg immediate release tablet Commonly known as:  Lonnie Burn Your last dose was: Your next dose is:    
   
   
 Dose:  5 mg Take 1 Tab by mouth every six (6) hours as needed. Max Daily Amount: 20 mg.  
 Quantity:  30 Tab Refills:  0 CONTINUE these medications which have NOT CHANGED Dose & Instructions Dispensing Information Comments Morning Noon Evening Bedtime  
 cholecalciferol (VITAMIN D3) 5,000 unit Tab tablet Commonly known as:  VITAMIN D3 Your last dose was: Your next dose is: Take  by mouth daily. Refills:  0  
     
   
   
   
  
 losartan 25 mg tablet Commonly known as:  COZAAR Your last dose was: Your next dose is: Take  by mouth daily. Refills:  0 STOP taking these medications HYDROcodone-acetaminophen 5-325 mg per tablet Commonly known as:  NORCO  
   
  
 triamterene-hydroCHLOROthiazide 37.5-25 mg per capsule Commonly known as:  Lennard Nyhan TYLENOL ARTHRITIS PAIN 650 mg CR tablet Generic drug:  acetaminophen Where to Get Your Medications Information on where to get these meds will be given to you by the nurse or doctor. ! Ask your nurse or doctor about these medications  
  enoxaparin 40 mg/0.4 mL  
 hyoscyamine sulfate 0.125 mg tablet  
 ondansetron 4 mg disintegrating tablet  
 oxyCODONE IR 5 mg immediate release tablet Discharge Instructions DISCHARGE SUMMARY from Nurse The following personal items are in your possession at time of discharge: 
 
Dental Appliances: None Visual Aid: Glasses Home Medications: None Jewelry: [place] Oar Clothing: Pants, Shirt, Footwear, Socks, Undergarments Other Valuables: None PATIENT INSTRUCTIONS: 
 
 
F-face looks uneven A-arms unable to move or move unevenly S-speech slurred or non-existent T-time-call 911 as soon as signs and symptoms begin-DO NOT go Back to bed or wait to see if you get better-TIME IS BRAIN. Warning Signs of HEART ATTACK Call 911 if you have these symptoms: 
? Chest discomfort. Most heart attacks involve discomfort in the center of the chest that lasts more than a few minutes, or that goes away and comes back. It can feel like uncomfortable pressure, squeezing, fullness, or pain. ? Discomfort in other areas of the upper body. Symptoms can include pain or discomfort in one or both arms, the back, neck, jaw, or stomach. ? Shortness of breath with or without chest discomfort. ? Other signs may include breaking out in a cold sweat, nausea, or lightheadedness. Don't wait more than five minutes to call [address]! Fast action can save your life.  Calling 911 is almost always the fastest way to get lifesaving treatment. Emergency Medical Services staff can begin treatment when they arrive  up to an hour sooner than if someone gets to the hospital by car. The discharge information has been reviewed with the patient. The patient verbalized understanding. Discharge medications reviewed with the patient and appropriate educational materials and side effects teaching were provided. Patient armband removed and shredded. Discharge Instructions Attachments/References SLEEVE GASTRECTOMY: POST-OP (ENGLISH) Discharge Orders None Introducing [facility]! Tanis Epley introduces [employer] patient portal. Now you can access parts of your medical record, email your doctor's office, and request medication refills online. 1. In your internet browser, go to [email or web address] 2. Click on the First Time User? Click Here link in the Sign In box. You will see the New Member Sign Up page. 3. Enter your Acquia Access Code exactly as it appears below. You will not need to use this code after youve completed the sign-up process. If you do not sign up before the expiration date, you must request a new code. · Acquia Access Code: [email or web address] Expires: 8/23/2017  2:31 PM 
 
4. Enter the last four digits of your Social Security Number (xxxx) and Date of Birth (mm/dd/yyyy) as indicated and click Submit. You will be taken to the next sign-up page. 5. Create a Acquia ID. This will be your Acquia login ID and cannot be changed, so think of one that is secure and easy to remember. 6. Create a Acquia password. You can change your password at any time. 7. Enter your Password Reset Question and Answer. This can be used at a later time if you forget your password. 8. Enter your e-mail address. You will receive e-mail notification when new information is available in [address] 9. Click Sign Up.  You can now view and download portions of your medical record. 10. Click the Download Summary menu link to download a portable copy of your medical information. If you have questions, please visit the Frequently Asked Questions section of the "[employer] website. Remember, "[employer] is NOT to be used for urgent needs. For medical emergencies, dial 911. Now available from your iPhone and Android! General Information Please provide this summary of care documentation to your next provider. Patient Signature:  ____________________________________________________________ Date:  ____________________________________________________________  
  
Vicente Saini Provider Signature:  ____________________________________________________________ Date:  ____________________________________________________________ More Information Sleeve Gastrectomy: What to Expect at [facility] Your Recovery Sleeve gastrectomy is surgery to remove part of the stomach to help with weight loss. The surgery limits the amount of food your stomach can hold. You will have some belly pain and may need pain medicine for the first week or so after surgery. The cuts (incisions) that the doctor made may be tender and sore. Because the surgery makes your stomach smaller, you will get full more quickly when you eat. It is important to think of this surgery as a tool to help you lose weight. It is not an instant fix. You will still need to eat a healthy diet and get regular exercise. This will help you reach your weight goal and avoid regaining the weight you lose. It is common to have many different emotions after this surgery. You may feel happy or excited as you begin to lose weight. But you may also feel overwhelmed or frustrated by the changes that you have to make in your diet, activity, and lifestyle. Talk with your doctor if you have concerns or questions.  
This care sheet gives you a general idea about how long it will take for you to recover. But each person recovers at a different pace. Follow the steps below to get better as quickly as possible. How can you care for yourself at home? Activity · Rest when you feel tired. Getting enough sleep will help you recover. · Try to walk each day. Start out by walking a little more than you did the day before. Bit by bit, increase the amount you walk. Walking boosts blood flow and helps prevent pneumonia and constipation. · Avoid lifting anything that would make you strain. This may include heavy grocery bags and milk containers, a heavy briefcase or backpack, cat litter or dog food bags, a vacuum , or a child. · Avoid strenuous activities, such as bicycle riding, jogging, weight lifting, or aerobic exercise, until your doctor says it is okay. Do not take part in any activity where you could be hit in the belly. This could be sports or playing with children. · Hold a pillow over your incision when you cough or take deep breaths. This will support your belly and decrease your pain. · Do breathing exercises at home as instructed by your doctor. This will help prevent pneumonia. · You can shower. Pat the incision dry. Do not take a bath for the first 2 weeks, or until your doctor tells you it is okay. · You may drive when you are no longer taking prescription pain medicine and can quickly move your foot from the gas pedal to the brake. You must also be able to sit comfortably for a long period of time, even if you do not plan to go far. You might get caught in traffic. · You will probably need to take 2 to 4 weeks off from work. It depends on the type of work you do and how you feel. · Ask your doctor when it is okay for you to have sex. Diet · For the first week, stay on a liquid or soft diet. This includes broths, soups, milk shakes, puddings, and mashed potatoes.  When you can eat these without difficulty, try other soft, solid foods, such as ground meat, shredded chicken, fish, pasta, and well-cooked vegetables and canned fruits. · Have 5 or 6 small meals each day instead of 2 or 3 large meals. Chew each bite of food very well. Eat slowly. You may need to take 20 to 30 minutes to eat a meal. 
· Avoid crusty breads, bagels, tough meats, raw vegetables, nuts and seeds (including crackers and breads that have nuts and seeds), and other foods that are hard to digest. If you feel full quickly, try to drink fluids between meals instead of with meals. · Avoid carbonated beverages, such as soda pop. · Avoid drinking with straws. This may help you swallow less air when you drink. · Gradually return to your normal foods. This usually takes 4 to 6 weeks. · Check with your doctor before drinking alcohol. Your body may absorb alcohol more quickly after surgery. · You may notice that your bowel movements are not regular right after your surgery. This is common. Try to avoid constipation and straining with bowel movements. Take a fiber supplement every day. If you have not had a bowel movement after a couple of days, ask your doctor about taking a mild laxative. Medicines · Your doctor will tell you if and when you can restart your medicines. He or she will also give you instructions about taking any new medicines. · If you take blood thinners, such as warfarin (Coumadin), clopidogrel (Plavix), or aspirin, be sure to talk to your doctor. He or she will tell you if and when to start taking those medicines again. Make sure that you understand exactly what your doctor wants you to do. · Take pain medicines exactly as directed. ¨ If the doctor gave you a prescription medicine for pain, take it as prescribed. ¨ Do not take two or more pain medicines at the same time unless the doctor told you to. Many pain medicines contain acetaminophen, which is Tylenol. Too much acetaminophen (Tylenol) can be harmful. ¨ Do not take aspirin (Minal, Bufferin), ibuprofen (Advil, Motrin), or naproxen (Aleve) until your doctor says it is okay. · If you think your pain medicine is making you sick to your stomach: 
¨ Take your medicine after meals (unless your doctor has told you not to). ¨ Ask your doctor for a different pain medicine. · If your doctor prescribed antibiotics, take them as directed. Do not stop taking them just because you feel better. You need to take the full course of antibiotics. Incision care · If you have strips of tape on the incision, leave the tape on for a week or until it falls off. · Wash the area daily with warm, soapy water and pat it dry. Don't use hydrogen peroxide or alcohol, which can slow healing. You may cover the area with a gauze bandage if it weeps or rubs against clothing. Change the bandage every day. · Keep the area clean and dry. Follow-up care is a key part of your treatment and safety. Be sure to make and go to all appointments, and call your doctor if you are having problems. It's also a good idea to know your test results and keep a list of the medicines you take. When should you call for help? Call 911 anytime you think you may need emergency care. For example, call if: 
· You passed out (lost consciousness). · You have severe trouble breathing. · You have sudden chest pain and shortness of breath, or you cough up blood. · You have severe pain in your belly or chest. 
Call your doctor now or seek immediate medical care if: 
· You are sick to your stomach or cannot keep fluids down. · You have pain that does not get better after you take pain medicine. · You have signs of infection, such as: 
¨ Increased pain, swelling, warmth, or redness. ¨ Red streaks leading from the incision. ¨ Pus draining from the incision. ¨ Swollen lymph nodes in your neck, armpits, or groin. ¨ A fever. · You have loose stitches, or your incision comes open. · You have signs of a blood clot, such as: 
¨ Pain in your calf, back of the knee, thigh, or groin. ¨ Redness and swelling in your leg or groin. · You have trouble passing urine or stool, especially if you have pain or swelling in your lower belly. Watch closely for changes in your health, and be sure to contact your doctor if: 
· You have trouble eating. · You have symptoms of dumping syndrome, such as feeling faint, shaky, and nauseated or having diarrhea after you eat. · You do not have a bowel movement after taking a laxative. Where can you learn more? Go to http://holly-anca.info/. Enter [phone] in the search box to learn more about \"Sleeve Gastrectomy: What to Expect at Home. \" Current as of: October 13, 2016 Content Version: 11.2 © 6411-2593 [employer] Care instructions adapted under license by [employer] (which disclaims liability or warranty for this information). If you have questions about a medical condition or this instruction, always ask your healthcare professional. [place] [address] any warranty or liability for your use of this information.

## 2017-06-14 NOTE — PROGRESS NOTES
1215 - received pt to floor. Pt is AO. Pain rated 6/10. C/o nausea, small amount of nausea. 1234 - pt assisted up to bathroom. Tolerated well. 1313 - assessment completed. Pt is AOx4. VSS. IV fluids infusing. Dressings are c/d/i. Pt placed on telemetry. Call bell and phone placed at bedside. 1411 - assisted pt up to bathroom. Daughter and  at bedside. 1639 - ambulated with pt and family in hallway. Pt able to tolerate well. No c/o nausea.

## 2017-06-14 NOTE — OP NOTES
Operative Note    PREOPERATIVE DIAGNOSIS: Clinically severe obesity, body mass index of 52,   comorbidities of  hypertension, obstructive sleep apnea - clinical and weight related arthopathies    POSTOPERATIVE DIAGNOSIS: same and hiatal hernia    PROCEDURES: Laparoscopic Vertical Sleeve Gastrectomy and suture repair of hiatal hernia    SURGEON: Dr. Christopher Euceda MD FACS     ASSISTANT: SA Juarez    ANESTHESIA: General endotracheal anesthesia. Local anesthetic mixture 1%   lidocaine, 0.5% Marcaine, with epinephrine injected locally utilizing 60mL. FINDINGS: as above and small hiatal hernia (repaired)    SPECIMEN: gastric fundus    ESTIMATED BLOOD LOSS: 10mL    FLUIDS: 2500mL    URINE OUTPUT: 600mL     DRAIN: vigil placed at beginning of operation and removed at conclusion of operation,     COMPLICATIONS: none noted    OPERATIVE START TIME: 0812    OPERATIVE COMPLETION TIME: 0950    DESCRIPTION OF PROCEDURE: The patient was prepped and draped in standard   sterile fashion after being placed under general anesthetic, vigil catheter placement and intragastric placement of a 40Fr VisiG tube. Pneumoperitoneum was established via LUQ Veress needle after a negative drop test.  We then insufflated to 15mmHg. A site was selected superior to the umbilicus in the midline. This area was incised. A 5mm Optical trocar was placed over the laparoscope and directed into the abdominal   Cavity using Optiview technique. Abdomen was surveyed. There was no evidence of injury from the entry. Additional trocars were then placed. All were 12 mm trocars, except for a 5mm trocar placed in the anterior axillary line left upper quadrant approximately 3 fingerbreadths below the costal margin. Another was placed in the lateral portion of the left rectus sheath approximately 3 superior to the umbilical trocar.   Another was placed approximately 4 fingerbreadths superior to the umbilical trocar in the  lateral portion of right rectus sheath. All were placed after incising with scalpel, all were placed using blunt dissecting technique. There was no evidence of injury from the entries. At this point, an incision was made near the xiphoid. Through this   incision, a Sabas retractor was placed through the abdominal   wall beneath the left lateral segment of the liver and connected to a bedside   retraction system allowing exposure of the esophageal hiatus. At ths point we noted a small hiatal hernia. We dissected the sac free from the left and right crura. It was a small defect which once completely reduced we repaired with a figure of 8 2-0 silk then an Ethibond. Once it was placed, the defect was closed and allowed adequate passage of the VISI-G tube. We then returned to the creation of the sleeve. The pylorus was then identified and six cm from it was marked at the greater curvature to identify the proximal extent of the staple line. The greater curve was then dissected from greater omentum using harmonic scalpel from the ren all the way around to the left charley. The posterior gastric attachments were divided as well. The Visi G tube was then advanced into the pylorus and lodged in place and then placed on suction. A black load of the [place] stapler with a SeamGuard reinforcement  was then placed at the proximal margin and clamped one tube width off of the tube line. It was then fired. Hemostasis was then verified. A second stapler,  green load with SeamGuard reinforcement,  was placed in the crotch of the first and advanced along the same line to the left charley. Care was taken to provide adequate distance between the tube and the stapler, especially at the the incisura. 4 additional fires with staple reinforcements were taken to completely divide the sleeve from the remnant stomach, until the entire sleeve was formed. We used a total of 1 black loads and 4 green loads.     Once the sleeve was completed, we turned our attention over to the hemostasis of the staple line. We placed a bowel clamp at the level of the pylorus. The tube was gently withdrawn slightly and insufflated to the green zone while holding under fluid. There were no bubbles identified indicating a negative leak test.  We then suctioned out the air and removed the bowel clamp and then completely withdrawn and the the sleeve was noted to be well-formed, with no evidence of twisting. The sites which were oozing were oversewn with sutures. The gastric remnant was removed from the abdominal cavity via the left paramedian incision after being placed in a specimen retrieval bag. The Sabas retractor was removed. The LUQ trocar site was closed at the fascia using a Vicryl suture placed using a  laparoscopic suture passer under direct vision without difficulty. The abdomen was desufflated via the remaining trocars. All trocars were then removed. The trocar sites were rendered hemostatic with Bovie cautery, anesthetized with the local anesthetic mixture and then closed with interrupted 4-0 Monocryl. Dermabond was applied. The patient was extubated, the vigil catheter was removed, and the patient was transferred to the perioperative care area in stable condition. The patient tolerated the procedure well, there were no complications. Sponge instrument, and needle counts were reported as correct x2.

## 2017-06-14 NOTE — H&P (VIEW-ONLY)
Dionne Grayson is a 50 y.o. female who comes into the office today after completing the entirety of the bariatric preoperative protocol satisfactorily. The patient initially identified obesity in childhood and has been struggling ever since. They have tried a variety of unsupervised weight-loss attempts, but has yet to meet with lasting success. Today, the patient's BMI is Body mass index is 52.51 kg/(m^2). , which reflects severe obesity. It is due to their severe obesity, which is further complicated by hypertension, obstructive sleep apnea - clinical and weight related arthopathies  that the patient is now seeking out bariatric surgery, specifically, the vertical sleeve gastrectomy. Pre op weight: 260  EBW: 143  Wt loss to date: 0     Past Medical History:   Diagnosis Date    Hypertension     Musculoskeletal disorder     Osteoarthritis        Past Surgical History:   Procedure Laterality Date    HX CHOLECYSTECTOMY      HX GYN         Current Outpatient Prescriptions   Medication Sig Dispense Refill    triamterene-hydroCHLOROthiazide (DYAZIDE) 37.5-25 mg per capsule Take  by mouth daily.  HYDROcodone-acetaminophen (NORCO) 5-325 mg per tablet Take  by mouth. No Known Allergies    Social History   Substance Use Topics    Smoking status: Former Smoker     Types: Cigarettes     Quit date: 2/17/1978    Smokeless tobacco: None    Alcohol use Yes      Comment: Socially       Family History   Problem Relation Age of Onset    Psychiatric Disorder Mother     Asthma Mother     Heart Disease Mother     Hypertension Mother     Diabetes Father            ROS, positive where in bold:    General: fevers, chills, night sweats, fatigue, weight loss, weight gain. GI: abdominal pain, nausea, vomiting, change in bowel habits, hematochezia, melena, or GERD. Integumentary: dermatitis or abnormal moles.     HEENT:  visual changes, vertigo, epistaxis, dysphagia, hoarseness    Cardiac: chest pain, palpitations, hypertension, edema,  varicosities    Resp:  cough, shortness of breath, wheezing, hemoptysis, snoring,  reactive airway disease    : hematuria, dysuria, frequency, urgency, nocturia, stress urinary incontinence    MSK: weakness, joint pain,  arthritis    Endocrine: diabetes, thyroid disease, polyuria, polydipsia, polyphagia, poor wound healing, heat intolerance,cold intolerance. Lymph/Heme: anemia, bruising,  history of blood transfusions. Neuro: dizziness, headache, fainting, seizures, stroke.     Psychiatry:  Anxiety, depression, psychosis      Physical Exam:  Visit Vitals    BP (!) 134/95    Pulse 89    Temp 97.5 °F (36.4 °C)    Ht 4' 11\" (1.499 m)    Wt 117.9 kg (260 lb)    LMP  (Approximate)    BMI 52.51 kg/m2       General: Well developed, well nourished 50 y.o. female in no acute distress  ENMT: normocephalic, atraumatic mouth:clear, no overt lesions, oral mucosa pink and moist  Neck: supple, no masses, no adenopathy or carotid bruits, trachea midline  Skin: warm, smooth, dry and well perfused  Respiratory: clear to auscultation bilaterally, no wheeze, rhonchi or rales, excursions normal and symmetrical  Cardiovascular: Regular rate and rhythm, no murmurs, clicks, gallops or rubs, no edema or varicosities  Gastrointestinal: soft, nontender, nondistended, normoactive bowel sounds, no hernias, no hepatosplenomegaly, minimally palpablecostal margins, mixed  distribution   Musculoskeletal: warm, well-perfused, no tenderness or swelling, normal gait/station  Neuro: sensation and strength grossly intact and symmetrical  Psych: alert and oriented to person, place and time        Studies:    Labs: within normal limits except for Vit D low (supplemented)     EKG: without significant abnormalities    UGI: no hiatal hernia or significant reflux    Nutritional evaluation has deemed a good candidate for weight loss surgery    Psychiatric evaluation has deemed a good candidate for weight loss surgery      Impression:  Mariajose Luna is suffering from morbid obesity and comorbidities who would benefit from bariatric surgery. We've discussed the restrictive nature of the sleeve gastrectomy. The patient understands the likelihood of losing approximately 50-60% of their excess weight in 12 to 18 months. She also understands the risks including but not limited to bleeding, infection, need for reoperation, leaks from staple line and strictures, bowel obstruction secondary to adhesions , DVT, PE, heart attack, stroke, and death. Patient also understands risks of inadequate weight loss, excess weight loss, vitamin insufficiency, protein malnutrition, excess skin, and loss of hair. We have reviewed the components of a successful postoperative course including requirement for a high protein, low carbohydrate diet, 60 oz a day of zero calorie liquids, daily vitamin supplementation, daily exercise, regular follow-up, and participation in support groups. We have reviewed the preoperative liver shrinking clear liquid diet, as well as reviewed any medication changes required while on the clear liquid diet. We will schedule them for laparoscopic vertical sleeve gastrectomy in the near future.

## 2017-06-14 NOTE — PROGRESS NOTES
conducted a pre-surgery visit with Kimberly Lee, who is a 50 y.o.,female. The  provided the following Interventions:  Initiated a relationship of care and support. Offered prayer and assurance of continued prayers on patient's behalf. Plan:  Chaplains will continue to follow and will provide pastoral care on an as needed/requested basis.  recommends bedside caregivers page  on duty if patient shows signs of acute spiritual or emotional distress.     Bryson Zepeda   Spiritual Care   [phone]

## 2017-06-14 NOTE — PROGRESS NOTES
TRANSFER - IN REPORT:    Verbal report received from Yann RN(name) on Gabriella Denis  being received from PACU(unit) for routine post - op      Report consisted of patients Situation, Background, Assessment and   Recommendations(SBAR). Information from the following report(s) SBAR, Kardex, OR Summary, Procedure Summary, Intake/Output and MAR was reviewed with the receiving nurse. Opportunity for questions and clarification was provided. Assessment completed upon patients arrival to unit and care assumed. Arrived to unit via bed in no acute distress. IV intact/patent, DTV by 1630 per PACU nurse.   Orientated to unit/room, safety measures in place, call bell in reach

## 2017-06-14 NOTE — ANESTHESIA POSTPROCEDURE EVALUATION
Post-Anesthesia Evaluation and Assessment    Patient: Sneha King MRN: [id number]  SSN: [id number]    YOB: 1970  Age: 50 y.o. Sex: female      Data from PACU flowsheet    Cardiovascular Function/Vital Signs  Visit Vitals    /68    Pulse 70    Temp 36.3 °C (97.4 °F)    Resp 23    Ht 4' 11\" (1.499 m)    Wt 117.9 kg (260 lb)    SpO2 100%    BMI 52.51 kg/m2       Patient is status post general anesthesia for Procedure(s):   LAPAROSCOPIC  SLEEVE GASTRECTOMY. Nausea/Vomiting: controlled    Postoperative hydration reviewed and adequate. Pain:  Pain Scale 1: Visual (06/14/17 1007)  Pain Intensity 1: 0 (06/14/17 1007)   Managed      Mental Status and Level of Consciousness: Alert and oriented     Pulmonary Status:   O2 Device: Non-rebreather mask (06/14/17 1010)   Adequate oxygenation and airway patent    Complications related to anesthesia: None    Post-anesthesia assessment completed.  No concerns    Signed By: Taylor Edwards MD     June 14, 2017

## 2017-06-14 NOTE — PERIOP NOTES
TRANSFER - OUT REPORT:    Verbal report given to Fernanda TALAMANTES(name) on Parisa Kay  being transferred to 2200(unit) for routine post - op       Report consisted of patients Situation, Background, Assessment and   Recommendations(SBAR). Information from the following report(s) SBAR, OR Summary, Intake/Output and MAR was reviewed with the receiving nurse. Lines:   Peripheral IV 06/14/17 Left Forearm (Active)   Site Assessment Clean, dry, & intact 6/14/2017 11:00 AM   Phlebitis Assessment 0 6/14/2017 11:00 AM   Infiltration Assessment 0 6/14/2017 11:00 AM   Dressing Status Clean, dry, & intact 6/14/2017 11:00 AM   Dressing Type Transparent;Tape 6/14/2017 11:00 AM   Hub Color/Line Status Pink; Infusing 6/14/2017 11:00 AM   Action Taken Open ports on tubing capped 6/14/2017 10:07 AM   Alcohol Cap Used Yes 6/14/2017 11:00 AM        Opportunity for questions and clarification was provided.       Patient transported with:   [employer]

## 2017-06-14 NOTE — PERIOP NOTES
Called out to family waiting. Spoke with pt , Bayron Vasquez. Updated on progress of procedure and pt status.

## 2017-06-15 ENCOUNTER — APPOINTMENT (OUTPATIENT)
Dept: GENERAL RADIOLOGY | Age: 47
DRG: 621 | End: 2017-06-15
Attending: SURGERY
Payer: COMMERCIAL

## 2017-06-15 VITALS
WEIGHT: 260 LBS | BODY MASS INDEX: 52.41 KG/M2 | HEIGHT: 59 IN | DIASTOLIC BLOOD PRESSURE: 88 MMHG | SYSTOLIC BLOOD PRESSURE: 144 MMHG | OXYGEN SATURATION: 91 % | TEMPERATURE: 97.6 F | HEART RATE: 91 BPM | RESPIRATION RATE: 16 BRPM

## 2017-06-15 LAB
ANION GAP BLD CALC-SCNC: 10 MMOL/L (ref 3–18)
ANION GAP BLD CALC-SCNC: 7 MMOL/L (ref 3–18)
BUN SERPL-MCNC: 6 MG/DL (ref 7–18)
BUN SERPL-MCNC: 7 MG/DL (ref 7–18)
BUN/CREAT SERPL: 7 (ref 12–20)
BUN/CREAT SERPL: 8 (ref 12–20)
CALCIUM SERPL-MCNC: 8.8 MG/DL (ref 8.5–10.1)
CALCIUM SERPL-MCNC: 8.8 MG/DL (ref 8.5–10.1)
CHLORIDE SERPL-SCNC: 100 MMOL/L (ref 100–108)
CHLORIDE SERPL-SCNC: 104 MMOL/L (ref 100–108)
CO2 SERPL-SCNC: 29 MMOL/L (ref 21–32)
CO2 SERPL-SCNC: 30 MMOL/L (ref 21–32)
CREAT SERPL-MCNC: 0.85 MG/DL (ref 0.6–1.3)
CREAT SERPL-MCNC: 0.88 MG/DL (ref 0.6–1.3)
ERYTHROCYTE [DISTWIDTH] IN BLOOD BY AUTOMATED COUNT: 13.4 % (ref 11.6–14.5)
GLUCOSE SERPL-MCNC: 128 MG/DL (ref 74–99)
GLUCOSE SERPL-MCNC: 133 MG/DL (ref 74–99)
HCT VFR BLD AUTO: 34.9 % (ref 35–45)
HGB BLD-MCNC: 11.9 G/DL (ref 12–16)
MCH RBC QN AUTO: 30.7 PG (ref 24–34)
MCHC RBC AUTO-ENTMCNC: 34.1 G/DL (ref 31–37)
MCV RBC AUTO: 90.2 FL (ref 74–97)
PLATELET # BLD AUTO: 186 K/UL (ref 135–420)
PMV BLD AUTO: 11.8 FL (ref 9.2–11.8)
POTASSIUM SERPL-SCNC: 3 MMOL/L (ref 3.5–5.5)
POTASSIUM SERPL-SCNC: 3.4 MMOL/L (ref 3.5–5.5)
RBC # BLD AUTO: 3.87 M/UL (ref 4.2–5.3)
SODIUM SERPL-SCNC: 139 MMOL/L (ref 136–145)
SODIUM SERPL-SCNC: 141 MMOL/L (ref 136–145)
WBC # BLD AUTO: 10.3 K/UL (ref 4.6–13.2)

## 2017-06-15 PROCEDURE — 74011250637 HC RX REV CODE- 250/637: Performed by: SURGERY

## 2017-06-15 PROCEDURE — 74011250636 HC RX REV CODE- 250/636: Performed by: SURGERY

## 2017-06-15 PROCEDURE — 77030020254 HC SOL INJ D5LR LACTATED RINGER

## 2017-06-15 PROCEDURE — C9113 INJ PANTOPRAZOLE SODIUM, VIA: HCPCS | Performed by: SURGERY

## 2017-06-15 PROCEDURE — 80048 BASIC METABOLIC PNL TOTAL CA: CPT | Performed by: PHYSICIAN ASSISTANT

## 2017-06-15 PROCEDURE — 36415 COLL VENOUS BLD VENIPUNCTURE: CPT | Performed by: SURGERY

## 2017-06-15 PROCEDURE — 74011636320 HC RX REV CODE- 636/320: Performed by: SURGERY

## 2017-06-15 PROCEDURE — 85027 COMPLETE CBC AUTOMATED: CPT | Performed by: SURGERY

## 2017-06-15 PROCEDURE — 80048 BASIC METABOLIC PNL TOTAL CA: CPT | Performed by: SURGERY

## 2017-06-15 PROCEDURE — 74240 X-RAY XM UPR GI TRC 1CNTRST: CPT

## 2017-06-15 PROCEDURE — 74011000250 HC RX REV CODE- 250: Performed by: SURGERY

## 2017-06-15 PROCEDURE — 74011258636 HC RX REV CODE- 258/636: Performed by: SURGERY

## 2017-06-15 PROCEDURE — 74011250636 HC RX REV CODE- 250/636: Performed by: PHYSICIAN ASSISTANT

## 2017-06-15 RX ORDER — ONDANSETRON 4 MG/1
4 TABLET, ORALLY DISINTEGRATING ORAL
Qty: 30 TAB | Refills: 0 | Status: SHIPPED | OUTPATIENT
Start: 2017-06-15 | End: 2017-10-20

## 2017-06-15 RX ORDER — HYOSCYAMINE SULFATE 0.12 MG/1
0.12 TABLET, ORALLY DISINTEGRATING ORAL
Qty: 30 TAB | Refills: 0 | Status: SHIPPED | OUTPATIENT
Start: 2017-06-15 | End: 2017-10-20

## 2017-06-15 RX ORDER — OXYCODONE HYDROCHLORIDE 5 MG/1
5 TABLET ORAL
Qty: 30 TAB | Refills: 0 | Status: SHIPPED | OUTPATIENT
Start: 2017-06-15 | End: 2017-10-20

## 2017-06-15 RX ORDER — ONDANSETRON 8 MG/1
4 TABLET, ORALLY DISINTEGRATING ORAL
Status: DISCONTINUED | OUTPATIENT
Start: 2017-06-15 | End: 2017-06-15 | Stop reason: HOSPADM

## 2017-06-15 RX ORDER — POTASSIUM CHLORIDE 7.45 MG/ML
10 INJECTION INTRAVENOUS
Status: COMPLETED | OUTPATIENT
Start: 2017-06-15 | End: 2017-06-15

## 2017-06-15 RX ORDER — ENOXAPARIN SODIUM 100 MG/ML
40 INJECTION SUBCUTANEOUS DAILY
Qty: 7 SYRINGE | Refills: 0 | Status: SHIPPED
Start: 2017-06-15 | End: 2017-10-20

## 2017-06-15 RX ADMIN — POTASSIUM CHLORIDE 10 MEQ: 7.46 INJECTION, SOLUTION INTRAVENOUS at 08:00

## 2017-06-15 RX ADMIN — POTASSIUM CHLORIDE 10 MEQ: 7.46 INJECTION, SOLUTION INTRAVENOUS at 12:11

## 2017-06-15 RX ADMIN — POTASSIUM CHLORIDE 10 MEQ: 7.46 INJECTION, SOLUTION INTRAVENOUS at 13:47

## 2017-06-15 RX ADMIN — SODIUM CHLORIDE, SODIUM LACTATE, POTASSIUM CHLORIDE, CALCIUM CHLORIDE, AND DEXTROSE MONOHYDRATE 150 ML/HR: 600; 310; 30; 20; 5 INJECTION, SOLUTION INTRAVENOUS at 11:49

## 2017-06-15 RX ADMIN — ENOXAPARIN SODIUM 40 MG: 40 INJECTION SUBCUTANEOUS at 10:51

## 2017-06-15 RX ADMIN — SODIUM CHLORIDE, SODIUM LACTATE, POTASSIUM CHLORIDE, CALCIUM CHLORIDE, AND DEXTROSE MONOHYDRATE 150 ML/HR: 600; 310; 30; 20; 5 INJECTION, SOLUTION INTRAVENOUS at 04:59

## 2017-06-15 RX ADMIN — IOHEXOL 50 ML: 240 INJECTION, SOLUTION INTRATHECAL; INTRAVASCULAR; INTRAVENOUS; ORAL at 09:11

## 2017-06-15 RX ADMIN — SODIUM CHLORIDE 40 MG: 9 INJECTION INTRAMUSCULAR; INTRAVENOUS; SUBCUTANEOUS at 11:16

## 2017-06-15 RX ADMIN — ONDANSETRON 4 MG: 2 INJECTION INTRAMUSCULAR; INTRAVENOUS at 07:27

## 2017-06-15 RX ADMIN — POTASSIUM CHLORIDE 10 MEQ: 7.46 INJECTION, SOLUTION INTRAVENOUS at 09:46

## 2017-06-15 RX ADMIN — ONDANSETRON 4 MG: 2 INJECTION INTRAMUSCULAR; INTRAVENOUS at 00:41

## 2017-06-15 RX ADMIN — HYOSCYAMINE SULFATE 0.12 MG: 0.12 TABLET, ORALLY DISINTEGRATING ORAL at 04:59

## 2017-06-15 RX ADMIN — POTASSIUM CHLORIDE 10 MEQ: 7.46 INJECTION, SOLUTION INTRAVENOUS at 10:47

## 2017-06-15 RX ADMIN — ACETAMINOPHEN 650 MG: 325 TABLET, FILM COATED ORAL at 16:15

## 2017-06-15 NOTE — PROGRESS NOTES
Certified [facility] provider rounded on Katie Huynh to provide education related to sleep apnea after chart review for risk factors. Risk factors include:  1. Hypertension  2. BMI exceeds 35.0: 52.5  3. Mallampati II  4. STOP BANG score 4  5. [place] Sleepiness score 11    Provided patient with the following pamphlets:  1. What is Sleep Apnea  2. Sleep and Medical problems  3. Obstructive Sleep Apnea & Positive Airway Pressure(PAP) Therapy  4. Sleep & Your Heart  5. Common Sleep Problems for Older Adults  6. Tips For Sleep As You Age  9. Tips For Better Sleep In Older Adults    Patient Education:  1. Reviewed sleep hygiene & effects of poor sleep quality. 2. Reviewed relationship between sleep & heart health. 3. Reviewed relationship between sleep & age. 4. Reviewed relationship between sleep & weight: Patient had recent gastric sleeve surgery. Recommendations:  1. Referral to sleep specialist for evaluation 9-12 months after surgery if symptoms of poor sleep quality present. 2. Order baseline PSG testing to be arranged as an outpatient. 3. Follow up with sleep specialist for treatment and management.

## 2017-06-15 NOTE — PROGRESS NOTES
Surgery Progress Note    6/15/2017    Admit Date: 6/14/2017    Subjective:     Patient has complaints of nausea and vomiting Pain is controlled with current regimen. Patient has been ambulating in halls. She reports nausea and vomiting and is tolerating ice chips well. Objective:     Blood pressure 119/78, pulse 89, temperature 98.1 °F (36.7 °C), resp. rate 18, height 4' 11\" (1.499 m), weight 117.9 kg (260 lb), SpO2 95 %.         06/13 1901 - 06/15 0700  In: 2403 [P.O.:150; I.V.:5145]  Out: 1600 [Urine:1600]    EXAM: GENERAL: alert, pleasant, no distress   HEART: regular rate and rhythm   LUNGS: clear to auscultation   ABDOMEN:  Soft, obese, appropriately tender, non distended, without active bowel sounds   INCISIONS: clean, dry, no erythema or drainage   EXTREMITIES: warm, well perfused    Data Review    Recent Results (from the past 24 hour(s))   METABOLIC PANEL, BASIC    Collection Time: 06/15/17  5:40 AM   Result Value Ref Range    Sodium 139 136 - 145 mmol/L    Potassium 3.0 (L) 3.5 - 5.5 mmol/L    Chloride 100 100 - 108 mmol/L    CO2 29 21 - 32 mmol/L    Anion gap 10 3.0 - 18 mmol/L    Glucose 128 (H) 74 - 99 mg/dL    BUN 7 7.0 - 18 MG/DL    Creatinine 0.88 0.6 - 1.3 MG/DL    BUN/Creatinine ratio 8 (L) 12 - 20      GFR est AA >60 >60 ml/min/1.73m2    GFR est non-AA >60 >60 ml/min/1.73m2    Calcium 8.8 8.5 - 10.1 MG/DL       Assessment:   Bala Smalls is a 50 y.o. female, postop day 1 status post laparoscopic sleeve gastrectomy. Condition: good    Plan:   -D/C telemetry  -Ambulate every four hours  -Tylenol is first line medication for pain control  -Roxicodone 1 tab po every 4 hour prn for moderate pain, only use if Tylenol is not controlling pain  -Advance to Clear liquid Gastric Bypass Diet, goal is to tolerate clear liquid diet, 4oz per hour one of which being a protein supplement.   Must do this for three consecutive hours before being considered for discharge.  -Will discharge home later today if patient is meeting liquid intake goals and is nausea free and off of nausea medication for at least four hours. Patient noted to be hypokalemic on morning labs. This is a new finding of unknown etiology. We will replete her potassium and recheck.

## 2017-06-15 NOTE — DISCHARGE INSTRUCTIONS
DISCHARGE SUMMARY from Nurse    The following personal items are in your possession at time of discharge:    Dental Appliances: None  Visual Aid: Glasses     Home Medications: None  Jewelry: Earrings  Clothing: Pants, Shirt, Footwear, Socks, Undergarments  Other Valuables: None             PATIENT INSTRUCTIONS:    After general anesthesia or intravenous sedation, for 24 hours or while taking prescription Narcotics:  · Limit your activities  · Do not drive and operate hazardous machinery  · Do not make important personal or business decisions  · Do  not drink alcoholic beverages  · If you have not urinated within 8 hours after discharge, please contact your surgeon on call. Report the following to your surgeon:  · Excessive pain, swelling, redness or odor of or around the surgical area  · Temperature over 100.5  · Nausea and vomiting lasting longer than 4 hours or if unable to take medications  · Any signs of decreased circulation or nerve impairment to extremity: change in color, persistent  numbness, tingling, coldness or increase pain  · Any questions        What to do at Home:  Recommended activity: No heavy lifting (greater than 15lb) for 4 weeks. If you experience any of the following symptoms : vomiting, severe abdominal pain, please follow up with Dr. Griselda Joyce. *  Please give a list of your current medications to your Primary Care Provider. *  Please update this list whenever your medications are discontinued, doses are      changed, or new medications (including over-the-counter products) are added. *  Please carry medication information at all times in case of emergency situations. These are general instructions for a healthy lifestyle:    No smoking/ No tobacco products/ Avoid exposure to second hand smoke    Surgeon General's Warning:  Quitting smoking now greatly reduces serious risk to your health.     Obesity, smoking, and sedentary lifestyle greatly increases your risk for illness    A healthy diet, regular physical exercise & weight monitoring are important for maintaining a healthy lifestyle    You may be retaining fluid if you have a history of heart failure or if you experience any of the following symptoms:  Weight gain of 3 pounds or more overnight or 5 pounds in a week, increased swelling in our hands or feet or shortness of breath while lying flat in bed. Please call your doctor as soon as you notice any of these symptoms; do not wait until your next office visit. Recognize signs and symptoms of STROKE:    F-face looks uneven    A-arms unable to move or move unevenly    S-speech slurred or non-existent    T-time-call 911 as soon as signs and symptoms begin-DO NOT go       Back to bed or wait to see if you get better-TIME IS BRAIN. Warning Signs of HEART ATTACK     Call 911 if you have these symptoms:   Chest discomfort. Most heart attacks involve discomfort in the center of the chest that lasts more than a few minutes, or that goes away and comes back. It can feel like uncomfortable pressure, squeezing, fullness, or pain.  Discomfort in other areas of the upper body. Symptoms can include pain or discomfort in one or both arms, the back, neck, jaw, or stomach.  Shortness of breath with or without chest discomfort.  Other signs may include breaking out in a cold sweat, nausea, or lightheadedness. Don't wait more than five minutes to call 911 - MINUTES MATTER! Fast action can save your life. Calling 911 is almost always the fastest way to get lifesaving treatment. Emergency Medical Services staff can begin treatment when they arrive -- up to an hour sooner than if someone gets to the hospital by car. The discharge information has been reviewed with the patient. The patient verbalized understanding. Discharge medications reviewed with the patient and appropriate educational materials and side effects teaching were provided.   Patient armband removed and shredded.

## 2017-06-15 NOTE — PROGRESS NOTES
Patient awake in bed with c/o nausea . Patient was educated on progression of diet this AM. Goal of 4 ounces per hour with one ounce being protein was clearly understood. Patient was instructed to go slow with small sips to reach goal. Patient given a report card to record intake. Education completed on I.S use and to ambulate in cuello at least 4 times. Will follow up and check progression.

## 2017-06-15 NOTE — PROGRESS NOTES
Patient has designated __________________spouse______ to participate in his/her discharge plan and to receive any needed information.      Name: Nellie Resendez  Address:  Phone number:[phone]

## 2017-06-15 NOTE — PROGRESS NOTES
0720 -received pt in room. Pt is resting in bed. Pain rated 4/10 to abdomen, states tolerable. 0743 - assessment completed. IV infusing. AOx4. Potassium infusing. Call bell at chair side    0830 - pt off floor to radiology    2370 - pt returned to floor    [id number] - observed pt's  in room. 1300 - pt up in chair, family in room    1615 - goals met, pt tolerating liquids, no c/o nausea/vomiting. received potassium results    1630 - received msg from yoon Yost to discharge.

## 2017-06-15 NOTE — PROGRESS NOTES
2913 Received shift report from Zenaida Ayala RN. Pt lying in bed with no complaints. Bed in lowest position, call bell within reach, will continue to monitor. 1945 Pt ambulating hallway. 2300 Pt ambulating hallway. 0445 Pt ambulating in room with complaints of nausea. Zofran not due, administered Hyoscyamine. 0600 Pt ambulating hallway with no complaints. Bedside and Verbal shift change report given to Zenaida Ayala RN (oncoming nurse) by Vito Wolfe RN (offgoing nurse). Report included the following information SBAR, Kardex, Intake/Output, MAR and Recent Results.

## 2017-06-15 NOTE — ROUTINE PROCESS
Bedside and Verbal shift change report given to Romario Roque RN (oncoming nurse) by Jc Holcomb RN(offgoing nurse). Report included the following information SBAR, Kardex and MAR.

## 2017-06-15 NOTE — PROGRESS NOTES
Post op diet progression discussed with patient. Patient to be discharged on a bariatric clear liquid diet. Patient verbalizes understanding of bariatric clear liquid and bariatric soft and moist diet. All of the patients questions and concerns have been addressed prior to discharge. Patient to follow up with surgeon in 7-14 days. Lovenox self injection instructions given. General Care after Surgery    1. No lifting over 15 lbs for 4 weeks. 2. No driving while taking the pain medication (approximately 7-10 days). 3. No tub baths, swimming or hot tubs until incisions are healed. (about 2 weeks)    4. You may shower. Clean incisions daily /gently with soap and check incisions for signs of infection:   Redness around incision   Swelling at site   Drainage with an foul odor (pus)   Increase tenderness around incision    5. Take your temperature and resting pulse in the morning and evening. Record on tracking form given to you. Call if your temperature is greater than 101 or your pulse rate is greater than 115.    6. Please contact your surgeon if you are having excessive abdominal pain (that lasts longer than 4 hours and does not improve with prescribed pain medication), vomiting or shortness of breath. 7. Get up and move - do not sit in one place for more than an hour. 8. You need to WALK (EXERCISE) for 30 minutes per day. (Walking around your house does not count)      a. Bike, treadmill and elliptical are OK  b. NO weight lifting or sit ups    9. If constipated take an adult dose of Debby lax (available over the counter). Contact the doctors office if Debby lax doesn'tT help. 10.  You may swallow pills starting the day after surgery as long as they fit inside this [place]:      11.  Continue to use your incentive spirometer (breathing machine) for the next couple of weeks to help prevent pneumonia. Phone numbers:   [facility] at [facility] at [address] [facility] phone #: [phone]  [place] phone# [phone]   Tyra Jimenez phone#: [phone]  Lorraines phone#: [phone]    Key Diet Principles Following Bariatric Surgery     1. Begin each meal with soft moist high protein foods (i.e. chicken, turkey, yogurt, tuna, eggs, cottage cheese, other fish and seafood). 2.  Consume a minimum of 64 oz. of fluid each day to prevent dehydration. No straws. 3.  No food and fluid together. Stop drinking 30 minutes before a meal.  You may begin fluids again 30 minutes after you finish a meal.    4.  Eat very slowly and chew all foods completely. 6.  Keep portions small. 7.  No simple sugars or high fat foods. No carbonated beverages. No Caffeine. 8.  Eat 3 meals per day. No skipping. Avoid snacking between meals. 9.  No alcohol. No Smoking. 10. Two [place] Complete Chewable vitamins each day. Take one in the morning and one at night. 11. 1500 mg Calcium Citrate per day in separate dosages      12. Vitamin D 3: 5000 IU taken per day. 13. Vitamin B-12:  Take 1000 mcg sublingually daily    14. Iron: 60 mg per day for women menstruating    15. Protein supplements of your choice. Must be low sugar (0-3 gm), low fat    (0-3 gm) and provide at least 35-40 gm of protein each day. You need a total     (food + supplements) of 60 - 70 grams of protein each day     16. Minimum of 30 minutes of physical activity daily. 16. Do not take NSAID or Steroids without your surgeons permission. [facility] Gastric Bypass and Sleeve Dietary Progression    Patient Name: A.B  Date of Surgery:  6/14    Ice Chips start once admitted on floor.  6/14     Begin Bariatric Clear Liquid Diet on: 6/15    Clear Liquid Diet: 64 oz. of fluid per day  o Low calorie, low sugar, non-carbonated beverages  - Water, Crystal Light, Propel Water, Sugar Free Jell-O, Sugar Free Popsicles, Bouillon  - Start protein supplement during this stage. (60-70 grams per day)  - Getting your fluid in and staying hydrated is your #1 priority! - The clear liquid diet will last for 7 days. Begin Bariatric Soft and Moist on: 6/22  - This stage of the diet will last for 5 weeks, unless otherwise instructed by your surgeon. - Begin:  1 week post-op   - End:  6 weeks post-op (or when you follow up with the Registered Dietitian)    - Soft, moist, high protein foods: 3 meals per day plus protein supplements. o   Portions should emphasize on soft protein. o Portions will be a MAXIMUM of:  o  1 ounce of solid food  o  2-3 ounces of cottage cheese and yogurt. o Protein supplements should be between meals and provide 30-40 grams per day during soft protein diet. o Continue to get 64 ounces of fluid in per day. - Protein foods that are ok on the Soft and Moist Diet include:  o Slow transition:  o 1st week on soft protein should focus on: Yogurt, cottage cheese, eggs  o 2nd -4th  week on soft protein diet should focus on: yogurt, cottage cheese, eggs, canned tuna, canned chicken, tilapia, fish (needs to be soft enough to be cut up with a fork)  o 5th week on soft protein diet should focus on: Yogurt, cottage cheese, eggs, canned tuna, canned chicken, tilapia, fish, salmon, chicken breast, or turkey. Remember to continue to get 64 ounces of fluid daily on ALL Stages. To be advanced to Bariatric Maintenance Stage of the bariatric diet, follow up with the dietitian 6 weeks post-op, around:      TEMPERATURE/HEART RATE LOG  WEEK 1  Day Date Morning temperature Morning heart rate Evening temperature Evening heart rate   1        2        3        4        5        6        7          WEEK 2  Day Date Morning temperature Morning heart rate Evening temperature Evening heart rate   1        2        3        4        5        6        7          Instructions: Take your temperature and heart rate (pulse) twice a day for 14 days. Take both in the morning and evening at about the same time each day (when you wake up and before you go to bed when you are relaxed)  Please contact your doctors office if your:  ? Temperature is higher than 101°  ? Heart rate (pulse) is higher than 115 beats per minute    (normal heart rate is  beats per minute)    [facility]  [phone]  [facility]:    [phone]        HOW TO TAKE YOUR HEART RATE (PULSE)         How to do it:  1. Turn your left hand so that your palm is face-up. 2. With the index and middle fingers of your right hand, draw a line from the base of your thumb to just below the crease in your wrist. Your fingers should nestle just to the left of the large tendon that pops up when you bend your wrist toward you. 3. DonT press too hard, that will make the pulse go away. Use gentle pressure. 4. Wait. It can take several secondsand several micro-adjustments in the placement of your two fingers on your wristto find your pulse. Just keep moving your fingers down or up your wrist in small increments (and pausing for a few seconds) until you find it. 5. To take your pulse rate:  1. Find a watch with a second hand and place it on your right wrist or on the table next to your left hand. 2. After finding your pulse, count the number of beats for 20 seconds. 3. Multiply by 3 to get your heart rate, or beats per minute (or just count for 60 seconds for a math-free option). 4. Normal, resting heart rate is about  beats per minute. Remember to keep all you follow up appointments and to have your labs drawn. Hydration Prevents Dehydration  You are required by you surgeon to drink 48-64 ounces of fluid every day to prevent dehydration. Dehydration is very serious and could require being admitted back to the hospital. You can reach your fluid goal of 48-64 ounces per day by constantly sipping small amounts of fluids. Water should be your drink of choice at all times.     Signs and Symptoms of dehydration (5% fluid loss)   Fatigue (loss of energy)   Thirst, Dry Mouth   Dry Skin, Skin Flushing   Dark color urine   Increased Heart rate & Respirations   Muscle cramps   Headache   Nausea   Tingling of  the limbs  (10% Fluid Loss   Muscle Spasms   Vomiting   Racing Pulse   Shriveled Skin   Painful Urination   Confusion   Difficulty breathing   Seizures/Unconsciousness  If you suspect you may be dehydrated call the office first.

## 2017-06-15 NOTE — PROGRESS NOTES
[place]   Discharge Planning/ Assessment    Reasons for Intervention: Spoke with pt lives with  Spouse. Confirmed demographics correct. pcp Dr Radha Snow. Independent  With adls and amb. . Uses a cane. Plan home.      High Risk Criteria  [] Yes  [x]No   Physician Referral  [] Yes  [x]No        Date    Nursing Referral  [] Yes  [x]No        Date    Patient/Family Request  [] Yes  [x]No        Date       Resources:    Medicare  [] Yes  []No   Medicaid  [] Yes  []No   No Resources  [] Yes  []No   Private Insurance  [x] Yes  []No    Name/Phone Number    Other  [] Yes  []No        (i.e. Workman's Comp)         Prior Services:    Prior Services  [] Yes  [x]No   Home Health  [] Yes  []No   [address] [place]  [] Yes  []No        Number of [address]  [] Yes  []No       Meals on Wheels  [] Yes  []No   Office on Aging  [] Yes  []No   Transportation Services  [] Yes  []No   Nursing Home  [] Yes  []No        Nursing Home Name    [address]  [] Yes  []No        [address] Name    Other       Information Source:      Information obtained from  [x] Patient  [] Parent   [] Amol Foote  [] Child  [] Spouse   [] Significant Other/Partner   [] Friend      [] EMS    [] Nursing Home Chart          [] Other:   Chart Review  [] Yes  []No     Family/Support System:    Patient lives with  [] Alone    [x] Spouse   [] Significant Other  [] Children  [] Caretaker   [] Parent  [] Sibling     [] Other       Other Support System:    Is the patient responsible for care of others  [] Yes  [x]No   Information of person caring for patient on  discharge    Managers financial affairs independently  [] Yes  [x]No   If no, explain:      Status Prior to Admission:    Mental Status  [] Awake  [] Alert  [x] Oriented  [] Quiet/Calm [] Lethargic/Sedated   [] Disoriented  [] Restless/Anxious  [] Combative   Personal Care  [] Dependent  [x] Independent Personal Care  [] Requires Assistance   Meal Preparation Ability  [x] Independent   [] Standby Assistance   [] Minimal Assistance   [] Moderate Assistance  [] Maximum Assistance     [] Total Assistance   Chores  [x] Independent with Chores   [] N/A Nursing Home Resident   [] Requires Assistance   Bowel/Bladder  [x] Continent  [] Catheter  [] Incontinent  [] Ostomy Self-Care    [] Urine Diversion Self-Care  [] Maximum Assistance     [] Total Assistance   Number of Persons needed for assistance    DME at home  [] Colleen Loomis  [x] Alonso Reynoso, Todd   [] Commode    [] Bathroom/Grab Bars  [] Hospital Bed  [] Nebulizer  [] Oxygen           [] Raised Toilet Seat  [] Shower Chair  [] Side Rails for Bed   [] Tub Transfer Bench   [] Gilberto Leahy  [] Vika Eagle      [] Other:   Vendor      Treatment Presently Receiving:    Current Treatments  [] Chemotherapy  [] Dialysis  [] Insulin  [] IVAB [] IVF   [] O2  [] PCA   [] PT   [] RT   [] Tube Feedings   [] Wound Care     Psychosocial Evaluation:    Verbalized Knowledge of Disease Process  [x] Patient  []Family   Coping with Disease Process  [] Patient  []Family   Requires Further Counseling Coping with Disease Process  [] Patient  []Family     Identified Projected Needs:    Home Health Aid  [] Yes  [x]No   Transportation  [] Yes  [x]No   Education  [] Yes  [x]No        Specific Education     Financial Counseling  [] Yes  [x]No   Inability to Care for Self/Will Require 24 hour care  [] Yes  [x]No   Pain Management  [] Yes  [x]No   Home Infusion Therapy  [] Yes  [x]No   Oxygen Therapy  [] Yes  [x]No   DME  [] Yes  [x]No   Long Term Care Placement  [] Yes  [x]No   Rehab  [] Yes  [x]No   Physical Therapy  [] Yes  [x]No   Needs Anticipated At This Time  [] Yes  [x]No     Intra-Hospital Referral:    [address] [place] [address]  [] Yes  [x]No     [] Yes  [x]No   Patient Representative  [] Yes  [x]No   Staff for Teaching Needs  [] Yes  [x]No   Specialty Teaching Needs     Diabetic Educator  [] Yes  [x]No Referral for Diabetic Educator Needed  [] Yes  [x]No  If Yes, place order for Nutritionist or Diabetic Consult     Tentative Discharge Plan:    Home with No Services  [x] Yes  []No   Home with [address]  [] Yes  []No        If Yes, specify type    Home Care Program  [] Yes  []No        If Yes, specify type    Meals on Wheels  [] Yes  []No   Office of Aging  [] Yes  []No   NHP  [] Yes  []No   Return to the [address]  [] Yes  []No   Rehab Therapy  [] Yes  []No   Acute Rehab  [] Yes  []No   Subacute Rehab  [] Yes  []No   Private Care  [] Yes  []No   Substance Abuse Referral  [] Yes  []No   Transportation  [] Yes  []No   Chore Service  [] Yes  []No   Inpatient Hospice  [] Yes  []No   OP RT  [] Yes  [] No   OP Hemo  [] Yes  [] No   OP PT  [] Yes  []No   Support Group  [] Yes  []No   Reach to Recovery  [] Yes  []No   OP Oncology Clinic  [] Yes  []No   Clinic Appointment  [] Yes  []No   DME  [] Yes  []No   Comments    Name of D/C Planner or  Given to Patient or Family Saadia awad rn cm    Phone Number [phone]        Extension    Date 6/15/17   Time    If you are discharged home, whom do you designate to participate in your discharge plan and receive any information needed?      Enter name of designee         Phone # of designee         Address of designee         Updated         Patient refused to designate any           individual

## 2017-06-15 NOTE — CDMP QUERY
Please clarify if this patient is being treated/managed for:    =>Hypokalemia     =>Other Explanation of clinical findings  =>Unable to Determine (no explanation of clinical findings)    The medical record reflects the following:    Risk: 51 yo female admitted for Bariatric surgery    Clinical Indicators: K+ 2.9 on admission    Treatment: Potassium supplements IV, serial labs    Please clarify and document your clinical opinion in the progress notes and discharge summary including the definitive and/or presumptive diagnosis, (suspected or probable), related to the above clinical findings. Please include clinical findings supporting your diagnosis. If you DECLINE this query or would like to communicate with [facility], please utilize the \"[facility] message box\" at the TOP of the Progress Note on the right.       Thank you,  Zahira Vuong RN [facility]  [phone]

## 2017-06-22 NOTE — DISCHARGE SUMMARY
Bariatric Surgery Discharge Progress Note    Admission Date: 6/14/2017    Discharge Date: 6/15/2017      Admission Diagnosis:    Clinically severe Obesity    Comorbidities:  hypertension, obstructive sleep apnea - clinical and weight related arthopathies    Discharge Diagnosis:     Clinically Severe Obesity, s/p laparoscopic sleeve gastrectomy and hiatal hernia repair with comorbidities as listed above    Procedures:   laparoscopic sleeve gastrectomy    Postop Complications: none    Hospital Course:  Patient was admitted on 6/14/2017 for scheduled bariatric surgery. Operation was without significant complication. Patient admitted to the floor postoperatively, monitored as per protocol. Diet sequentially advanced beginning POD 1, pain medications transitioned to oral during the hospital course. At the time of discharge, the patient is afebrile, vital signs stable, tolerating a clear liquid diet with protein supplementation, voiding spontaneously, ambulatory with adequate pain control with oral medications and clear surgical sites without evidence of infection.     Discharge Diet:  Clear Liquid Bariatric Diet for 7 days, then soft moist protein diet for 5 weeks    Discharge Medications:   *All medications as per Medical Reconciliation Form\"    [place] Complete Chewable vitamins, 2 orally daily for life  Calcium Citrate 2000mg orally daily for life  Vitamin B12 1000micrograms sublingual daily for life  Percocet 5/325 tabs, 1-2 by mouth every 4-6 hours as needed for pain   Enoxaparin (Lovenox) 40mg sub-Q daily for 7 days    Discharge disposition: home    Local wound care with daily showers, keep wounds clean and dry    Activity: as desired, no lifting greater than 15lbs or situps for 30 days    Special Instructions:   No driving until activity is not influenced by incisional pain and off narcotics   No bath or hot tub until wounds are healed   Pulse and temperature twice daily for 10 days   Notify [facility] for a Temp >100.5 or Pulse>115    Followup with surgeon in 2 weeks

## 2017-06-26 NOTE — ANCILLARY DISCHARGE INSTRUCTIONS
[facility]  Discharge Phone Call       After-Care Discharge Phone Call Questions:    Were you able to get your prescriptions filled? Comment:      [x] Yes  []No    Comment if answer is \"No\"   Are you taking your medication(s) as your doctor ordered? Do you understand the purpose of your medications? Comment:    [x] Yes  []No    Comment if answer is \"No\"   Are you taking any other medications that are not on the list?  Comment:      [] Yes  [x]No    Comment if answer is \"Yes\"   Do you have any questions about your medications? Are you aware of potential side effects? Comment:    [] Yes  [x]No    Comment if answer is \"Yes\"   Did you make your follow-up appointments (if the hospital did not do this before  discharge)? Comment:    [x] Yes  []No    Comment if answer is \"No\"   Is there any reason you might not be able to keep your follow-up appointments? Comment:     [] Yes  [x]No    Comment if answer is \"Yes\"   Do you have any questions about your care plan? Are you aware of what health problems to be alert for? Comment:    [] Yes  [x]No    Comment if answer is \"Yes\"   Do you have a good understanding of how you should manage your health? Comment:    [x] Yes  []No    Comment if answer is \"Yes\"   Do you know which symptoms to watch for that would mean you would need to call your doctor right away? Comment:      [x] Yes  []No    Comment if answer is \"No\"   Do you have any questions about the follow up process or any instructions that we have provided? Comment:    [] Yes  [x]No    Comment if answer is \"Yes\"   Did staff take your preferences into account?         [x] Yes  []No    Comment if answer is \"Yes\"

## 2017-06-28 ENCOUNTER — OFFICE VISIT (OUTPATIENT)
Dept: SURGERY | Age: 47
End: 2017-06-28

## 2017-06-28 VITALS
WEIGHT: 243 LBS | SYSTOLIC BLOOD PRESSURE: 118 MMHG | TEMPERATURE: 97.8 F | DIASTOLIC BLOOD PRESSURE: 80 MMHG | HEART RATE: 110 BPM | RESPIRATION RATE: 20 BRPM | HEIGHT: 59 IN | BODY MASS INDEX: 48.99 KG/M2

## 2017-06-28 DIAGNOSIS — E66.01 MORBID (SEVERE) OBESITY DUE TO EXCESS CALORIES (HCC): Primary | ICD-10-CM

## 2017-06-28 DIAGNOSIS — K21.9 GASTROESOPHAGEAL REFLUX DISEASE WITHOUT ESOPHAGITIS: ICD-10-CM

## 2017-06-28 RX ORDER — OMEPRAZOLE 20 MG/1
20 CAPSULE, DELAYED RELEASE ORAL 2 TIMES DAILY
Qty: 60 CAP | Refills: 2 | Status: SHIPPED | OUTPATIENT
Start: 2017-06-28

## 2017-06-28 RX ORDER — LANOLIN ALCOHOL/MO/W.PET/CERES
1000 CREAM (GRAM) TOPICAL DAILY
COMMUNITY

## 2017-06-28 NOTE — MR AVS SNAPSHOT
Visit Information Date & Time Provider Department Dept. Phone Encounter #  
 6/28/2017  1:45 PM Suzie Siddiqi MD [place] [id number] Follow-up Instructions Return in about 3 months (around 9/28/2017). Your Appointments 7/19/2017  2:00 PM  
NUTRITION COUNSELING with [facility] DIETICIAN [address] Lopes ([facility]) Appt Note: post op sleeve nutrition [address] [place] [address] [place] [address] [facility] [address]  
  
    
 9/28/2017  2:00 PM  
Office Visit with Suzie Siddiqi MD  
[address] Lopes ([facility]) Appt Note: 3month GB f/u. .twila  
 [address] [place]  [address] Upcoming Health Maintenance Date Due DTaP/Tdap/Td series (1 - Tdap) 4/27/1989 PAP AKA CERVICAL CYTOLOGY 4/27/1989 INFLUENZA AGE 9 TO ADULT 8/1/2017 Allergies as of 6/28/2017  Review Complete On: 6/28/2017 By: Mary Kate Benedict No Known Allergies Current Immunizations  Never Reviewed No immunizations on file. Not reviewed this visit You Were Diagnosed With   
  
 Codes Comments Morbid (severe) obesity due to excess calories ([facility] [address]    -  Primary ICD-10-CM: E66.01 
ICD-9-CM: 278.01   
 BMI 45.0-49.9, adult (HCC)     ICD-10-CM: P49.98 
ICD-9-CM: V85.42 Gastroesophageal reflux disease without esophagitis     ICD-10-CM: K21.9 ICD-9-CM: 530.81 Vitals BP Pulse Temp Resp Height(growth percentile) Weight(growth percentile) 118/80 (BP 1 Location: Left arm, BP Patient Position: At rest) (!) 110 97.8 °F (36.6 °C) (Oral) 20 4' 11\" (1.499 m) 243 lb (110.2 kg) LMP BMI OB Status Smoking Status (Approximate) 49.08 kg/m2 Hysterectomy Former Smoker BMI and BSA Data Body Mass Index Body Surface Area 49.08 kg/m 2 2.14 m 2 Preferred Pharmacy Pharmacy Name Phone [id number]- Gigi Schirmer, [address] [phone] Your Updated Medication List  
  
   
This list is accurate as of: 6/28/17  2:43 PM.  Always use your most recent med list.  
  
  
  
  
 CALCIUM 600 + D 600-125 mg-unit Tab Generic drug:  calcium-cholecalciferol (d3) Take  by mouth. cholecalciferol (VITAMIN D3) 5,000 unit Tab tablet Commonly known as:  VITAMIN D3 Take  by mouth daily. cyanocobalamin 1,000 mcg tablet Take 1,000 mcg by mouth daily. enoxaparin 40 mg/0.4 mL Commonly known as:  LOVENOX  
0.4 mL by SubCUTAneous route daily. hyoscyamine sulfate 0.125 mg tablet Commonly known as:  CYSTOSPAZ 1 Tab by SubLINGual route every six (6) hours as needed for Other (Spasms). losartan 25 mg tablet Commonly known as:  COZAAR Take  by mouth daily. MULTI VITAMIN PO Take  by mouth. omeprazole 20 mg capsule Commonly known as:  PRILOSEC Take 1 Cap by mouth two (2) times a day. ondansetron 4 mg disintegrating tablet Commonly known as:  ZOFRAN ODT Take 1 Tab by mouth every six (6) hours as needed. oxyCODONE IR 5 mg immediate release tablet Commonly known as:  Juliann Carrow Take 1 Tab by mouth every six (6) hours as needed. Max Daily Amount: 20 mg.  
  
  
  
  
Prescriptions Sent to Pharmacy Refills  
 omeprazole (PRILOSEC) 20 mg capsule 2 Sig: Take 1 Cap by mouth two (2) times a day. Class: Normal  
 Pharmacy: [address], [address] Ph #: [phone] Route: Oral  
  
Follow-up Instructions Return in about 3 months (around 9/28/2017). To-Do List   
 06/28/2017 Lab:  CBC WITH AUTOMATED DIFF   
  
 06/28/2017 Lab:  FERRITIN   
  
 06/28/2017 Lab:  IRON   
  
 06/28/2017 Lab: METABOLIC PANEL, COMPREHENSIVE   
  
 06/28/2017 Lab:  VITAMIN B1, WHOLE BLOOD   
  
 06/28/2017 Lab:  VITAMIN B12 & FOLATE   
  
 06/28/2017 Lab:  VITAMIN D, 25 HYDROXY Introducing [facility] HE[facility]! [employer] introduces [employer] patient portal. Now you can access parts of your medical record, email your doctor's office, and request medication refills online. 1. In your internet browser, go to [email or web address] 2. Click on the First Time User? Click Here link in the Sign In box. You will see the New Member Sign Up page. 3. Enter your [employer] Access Code exactly as it appears below. You will not need to use this code after youve completed the sign-up process. If you do not sign up before the expiration date, you must request a new code. · [employer] Access Code: [email or web address] Expires: 8/23/2017  2:31 PM 
 
4. Enter the last four digits of your Social Security Number (xxxx) and Date of Birth (mm/dd/yyyy) as indicated and click Submit. You will be taken to the next sign-up page. 5. Create a [employer] ID. This will be your [employer] login ID and cannot be changed, so think of one that is secure and easy to remember. 6. Create a [employer] password. You can change your password at any time. 7. Enter your Password Reset Question and Answer. This can be used at a later time if you forget your password. 8. Enter your e-mail address. You will receive e-mail notification when new information is available in [address] 9. Click Sign Up. You can now view and download portions of your medical record. 10. Click the Download Summary menu link to download a portable copy of your medical information. If you have questions, please visit the Frequently Asked Questions section of the [employer] website. Remember, [employer] is NOT to be used for urgent needs. For medical emergencies, dial 911. Now available from your iPhone and Android! Please provide this summary of care documentation to your next provider. Your primary care clinician is listed as Ollie Humphrey. If you have any questions after today's visit, please call [phone].

## 2017-06-28 NOTE — PROGRESS NOTES
Jessica Pickard is a 50 y.o. female that presents today to follow up post  Vertical sleeve preformed on 6/14/2017. Pt says pain level is at a 0 on a scale from 1-10. Pt is drinking 64oz of fluid daily. Pt is currently eating protein shakes,tuna,eggs. Pt says the amount of time spent exercising is walking 30 minutes daily. Body mass index is 49.08 kg/(m^2). 1. Have you been to the ER, urgent care clinic since your last visit? Hospitalized since your last visit? No    2. Have you seen or consulted any other health care providers outside of the [address] [place] [address] since your last visit? Include any pap smears or colon screening.  No

## 2017-06-28 NOTE — PROGRESS NOTES
Patient seen and examined. Feeling well. NO complaints outside of a bit of reflux, controlled with OTC remedies. Agree with findings of AUBRIE Goldsmith PA-C. Will see in 3 months or sooner if needed.   Continue to take OTC PPI

## 2017-06-28 NOTE — PROGRESS NOTES
Subjective:      Kimberly Lee is a 50 y.o. female is now 2 weeks status post laparoscopic sleeve gastrectomy. Doing well overall. Currently on a stage 3 diet without difficulty. Taking in 64oz water daily. Sources of protein include tuna, protein shakes, eggs and yogurt. 30 min of activity 7 days a week, including walking. Bowel movements are regular. The patient is not having any pain. . The patient is compliant with multivitamins, calcium, Vit D and B12 supplements. Patient states that her right leg has had a numbness and tingling since surgery when she is laying down, but it subsided a few days ago. Patient has been having GERD symptoms of a burning sensation in her chest that is all day long and has woken her up from her sleep. Weight Loss Metrics 2017   Pre op / Initial Wt 260 - - 260 - - 281   Today's Wt - 243 lb 260 lb - 260 lb 261 lb -   BMI - 49.08 kg/m2 52.51 kg/m2 - 52.51 kg/m2 52.72 kg/m2 -   Ideal Body Wt 117 - - 117 - - 117   Excess Body Wt 143 - - 143 - - 164   Goal Wt 150 - - 145.6 - - 150   Wt loss to date 17 - - 0 - - 0   % Wt Loss 0.15 - - 0 - - 0   80% .4 - - 114.4 - - 131.2       Body mass index is 49.08 kg/(m^2).     Comorbidities:    Hypertension: improved  Diabetes: not applicable  Obstructive Sleep Apnea: not applicable  Hyperlipidemia: not applicable  Stress Urinary Incontinence: not applicable  Gastroesophageal Reflux: worsened  Weight related arthropathy:improved        Past Medical History:   Diagnosis Date    Hypertension     Morbid obesity ([address]     Musculoskeletal disorder     Osteoarthritis     Right hip pain        Past Surgical History:   Procedure Laterality Date    HX  SECTION      HX CHOLECYSTECTOMY      HX GI  2017    Vertical sleeve gastrectomy    HX GYN      HX HYSTERECTOMY         Current Outpatient Prescriptions   Medication Sig Dispense Refill    calcium-cholecalciferol, d3, (CALCIUM 600 + D) 600-125 mg-unit tab Take  by mouth.  cyanocobalamin 1,000 mcg tablet Take 1,000 mcg by mouth daily.  MULTIVIT-MINERALS/FERROUS FUM (MULTI VITAMIN PO) Take  by mouth.  losartan (COZAAR) 25 mg tablet Take  by mouth daily.  cholecalciferol, VITAMIN D3, (VITAMIN D3) 5,000 unit tab tablet Take  by mouth daily.  enoxaparin (LOVENOX) 40 mg/0.4 mL 0.4 mL by SubCUTAneous route daily. 7 Syringe 0    hyoscyamine sulfate (CYSTOSPAZ) 0.125 mg tablet 1 Tab by SubLINGual route every six (6) hours as needed for Other (Spasms). 30 Tab 0    ondansetron (ZOFRAN ODT) 4 mg disintegrating tablet Take 1 Tab by mouth every six (6) hours as needed. 30 Tab 0    oxyCODONE IR (ROXICODONE) 5 mg immediate release tablet Take 1 Tab by mouth every six (6) hours as needed. Max Daily Amount: 20 mg. 30 Tab 0       No Known Allergies      Objective:     Visit Vitals    /80 (BP 1 Location: Left arm, BP Patient Position: At rest)    Pulse (!) 110    Temp 97.8 °F (36.6 °C) (Oral)    Resp 20    Ht 4' 11\" (1.499 m)    Wt 110.2 kg (243 lb)    LMP  (Approximate)    BMI 49.08 kg/m2       General:  alert, cooperative, no distress, appears stated age   Chest: lungs clear to auscultation, no accessory muscle use   Cor:   Regular rate and rhythm   Abdomen: soft, bowel sounds active, non-tender, no hernias   Incisions:   healing well, no drainage, no erythema, no hernia, no seroma, no swelling, no dehiscence, incision well approximated       Assessment:   GERD  Doing well postoperatively.     Plan:   Nexium  Follow up with Registered Dietician  Encouraged to attend support group  Follow up in 3 months

## 2017-07-19 ENCOUNTER — DOCUMENTATION ONLY (OUTPATIENT)
Dept: SURGERY | Age: 47
End: 2017-07-19

## 2017-07-19 NOTE — PROGRESS NOTES
[facility]  POST-OP NUTRITION FOLLOW UP    Patient's Name: Chantale Harris  YOB: 1970  Surgery Date: 2017    Procedure: Gastric Sleeve    Height: 4'11\"   Pre-Op Weight: 260   Current Weight: 237#  Weight Lost: 01#         Complications  Readmittance: no  Reoperations: no  Complications: no  IV Fluids: no  ER Trips: no    Problem Areas:   Nausea: yes, 1st week but went away. Vomiting: no   Dumping Syndrome: no  Inadequate Protein: no  Inadequate Fluids: no  Food Intolerance: no  Hunger: yes, just started  Constipation: no    Eating 3 Meals/Day:yes  Portion Size at Meals: 1-2   Protein from Food: 21    Foods being consumed:  Breakfast: Time:8:30  Protein shake, boiled eggs  Lunch: Time: 12:45    Tuna, deviled eggs. Dinner:  Time: 5:30   [place], fish, string beans. In-between eating: protein drinks    Length of time for meals: 40 minutes  food/fluids: yes    Fluids: >64 oz/day Types of Fluids: water, water with crystal,     MVI: Flintstones  Number/Day: 2 Taken Separately: yes    Calcium: calcium citrate  Calcium Dosin mg (RD encouraged 0413-4080 mg daily)  Taken Separately: yes    Vitamin B12: 1000 Vitamin B12 Dosing: daily    Vitamin D: 5000   Vitamin D dosing: daily         Protein Supplement: Premier   Grams of Protein: 30     Patient is taking 7 days a week. Exercise (FITT): Water aerobics, walk dog. Comments:    Patient is doing fantastic. Patient was educated on the importance of eating meat and vegetables only. I have talked with patient about the effects of carbohydrates, not only from a weight management perspective, but also what effects it could have on their blood sugar and what reactive hypoglycemia is. Diet Follow Up:  9 month class scheduled.     Brittani Parsons, MARIA VICTORIA    2017

## 2017-09-27 ENCOUNTER — HOSPITAL ENCOUNTER (OUTPATIENT)
Dept: LAB | Age: 47
Discharge: HOME | End: 2017-09-27
Payer: COMMERCIAL

## 2017-09-27 LAB
25(OH)D3 SERPL-MCNC: 32.9 NG/ML (ref 30–100)
ALBUMIN SERPL-MCNC: 3.6 G/DL (ref 3.4–5)
ALBUMIN/GLOB SERPL: 0.9 {RATIO} (ref 0.8–1.7)
ALP SERPL-CCNC: 54 U/L (ref 45–117)
ALT SERPL-CCNC: 34 U/L (ref 13–56)
ANION GAP SERPL CALC-SCNC: 8 MMOL/L (ref 3–18)
AST SERPL-CCNC: 21 U/L (ref 15–37)
BASOPHILS # BLD: 0 K/UL (ref 0–0.06)
BASOPHILS NFR BLD: 0 % (ref 0–2)
BILIRUB SERPL-MCNC: 0.3 MG/DL (ref 0.2–1)
BUN SERPL-MCNC: 13 MG/DL (ref 7–18)
BUN/CREAT SERPL: 18 (ref 12–20)
CALCIUM SERPL-MCNC: 9.4 MG/DL (ref 8.5–10.1)
CHLORIDE SERPL-SCNC: 109 MMOL/L (ref 100–108)
CO2 SERPL-SCNC: 25 MMOL/L (ref 21–32)
CREAT SERPL-MCNC: 0.73 MG/DL (ref 0.6–1.3)
DIFFERENTIAL METHOD BLD: ABNORMAL
EOSINOPHIL # BLD: 0 K/UL (ref 0–0.4)
EOSINOPHIL NFR BLD: 1 % (ref 0–5)
ERYTHROCYTE [DISTWIDTH] IN BLOOD BY AUTOMATED COUNT: 14.7 % (ref 11.6–14.5)
FERRITIN SERPL-MCNC: 180 NG/ML (ref 8–388)
FOLATE SERPL-MCNC: >20 NG/ML (ref 3.1–17.5)
GLOBULIN SER CALC-MCNC: 3.9 G/DL (ref 2–4)
GLUCOSE SERPL-MCNC: 81 MG/DL (ref 74–99)
HCT VFR BLD AUTO: 36.9 % (ref 35–45)
HGB BLD-MCNC: 12.1 G/DL (ref 12–16)
IRON SERPL-MCNC: 76 UG/DL (ref 50–175)
LYMPHOCYTES # BLD: 2.2 K/UL (ref 0.9–3.6)
LYMPHOCYTES NFR BLD: 55 % (ref 21–52)
MCH RBC QN AUTO: 29.8 PG (ref 24–34)
MCHC RBC AUTO-ENTMCNC: 32.8 G/DL (ref 31–37)
MCV RBC AUTO: 90.9 FL (ref 74–97)
MONOCYTES # BLD: 0.2 K/UL (ref 0.05–1.2)
MONOCYTES NFR BLD: 6 % (ref 3–10)
NEUTS SEG # BLD: 1.5 K/UL (ref 1.8–8)
NEUTS SEG NFR BLD: 38 % (ref 40–73)
PLATELET # BLD AUTO: 191 K/UL (ref 135–420)
PMV BLD AUTO: 11.9 FL (ref 9.2–11.8)
POTASSIUM SERPL-SCNC: 3.6 MMOL/L (ref 3.5–5.5)
PROT SERPL-MCNC: 7.5 G/DL (ref 6.4–8.2)
RBC # BLD AUTO: 4.06 M/UL (ref 4.2–5.3)
SODIUM SERPL-SCNC: 142 MMOL/L (ref 136–145)
VIT B12 SERPL-MCNC: 1269 PG/ML (ref 211–911)
WBC # BLD AUTO: 3.9 K/UL (ref 4.6–13.2)

## 2017-09-27 PROCEDURE — 82728 ASSAY OF FERRITIN: CPT | Performed by: PHYSICIAN ASSISTANT

## 2017-09-27 PROCEDURE — 82607 VITAMIN B-12: CPT | Performed by: PHYSICIAN ASSISTANT

## 2017-09-27 PROCEDURE — 85025 COMPLETE CBC W/AUTO DIFF WBC: CPT | Performed by: PHYSICIAN ASSISTANT

## 2017-09-27 PROCEDURE — 80053 COMPREHEN METABOLIC PANEL: CPT | Performed by: PHYSICIAN ASSISTANT

## 2017-09-27 PROCEDURE — 83540 ASSAY OF IRON: CPT | Performed by: PHYSICIAN ASSISTANT

## 2017-09-27 PROCEDURE — 84425 ASSAY OF VITAMIN B-1: CPT | Performed by: PHYSICIAN ASSISTANT

## 2017-09-27 PROCEDURE — 82306 VITAMIN D 25 HYDROXY: CPT | Performed by: PHYSICIAN ASSISTANT

## 2017-09-27 PROCEDURE — 36415 COLL VENOUS BLD VENIPUNCTURE: CPT | Performed by: PHYSICIAN ASSISTANT

## 2017-09-30 LAB — VIT B1 BLD-SCNC: 105.5 NMOL/L (ref 66.5–200)

## 2017-10-20 ENCOUNTER — OFFICE VISIT (OUTPATIENT)
Dept: SURGERY | Age: 47
End: 2017-10-20

## 2017-10-20 VITALS
RESPIRATION RATE: 18 BRPM | BODY MASS INDEX: 45.16 KG/M2 | DIASTOLIC BLOOD PRESSURE: 89 MMHG | TEMPERATURE: 98.4 F | OXYGEN SATURATION: 99 % | WEIGHT: 224 LBS | HEIGHT: 59 IN | HEART RATE: 76 BPM | SYSTOLIC BLOOD PRESSURE: 135 MMHG

## 2017-10-20 DIAGNOSIS — K91.2 POSTOPERATIVE MALABSORPTION: ICD-10-CM

## 2017-10-20 DIAGNOSIS — E66.01 MORBID OBESITY DUE TO EXCESS CALORIES (HCC): Primary | ICD-10-CM

## 2017-10-20 NOTE — PROGRESS NOTES
1. Have you been to the ER, urgent care clinic since your last visit? Hospitalized since your last visit? No    2. Have you seen or consulted any other health care providers outside of the [address] [place] [address] since your last visit? Include any pap smears or colon screening.  Yes PCP

## 2017-10-20 NOTE — MR AVS SNAPSHOT
Visit Information Date & Time Provider Department Dept. Phone Encounter #  
 10/20/2017  2:30 PM Karen Barclay MD [facility] [phone] [id number] Your Appointments 3/15/2018  3:00 PM  
Any with [facility] DIETICIAN [address]) [address] [facility] [address] [facility] [address]  
  
   
 [address] [facility] 88 710 [facility] 95 Upcoming Health Maintenance Date Due DTaP/Tdap/Td series (1 - Tdap) 4/27/1989 PAP AKA CERVICAL CYTOLOGY 4/27/1989 INFLUENZA AGE 9 TO ADULT 8/1/2017 Allergies as of 10/20/2017  Review Complete On: 10/20/2017 By: Gay Goins LPN No Known Allergies Current Immunizations  Never Reviewed No immunizations on file. Not reviewed this visit Vitals BP Pulse Temp Resp Height(growth percentile) Weight(growth percentile) 135/89 76 98.4 °F (36.9 °C) (Oral) 18 4' 11\" (1.499 m) 224 lb (101.6 kg) LMP SpO2 BMI OB Status Smoking Status (Approximate) 99% 45.24 kg/m2 Hysterectomy Former Smoker BMI and BSA Data Body Mass Index Body Surface Area  
 45.24 kg/m 2 2.06 m 2 Preferred Pharmacy Pharmacy Name Phone [id number] [address], [address] [facility] [phone] Your Updated Medication List  
  
   
This list is accurate as of: 10/20/17  2:46 PM.  Always use your most recent med list.  
  
  
  
  
 CALCIUM 600 + D 600-125 mg-unit Tab Generic drug:  calcium-cholecalciferol (d3) Take  by mouth. cholecalciferol (VITAMIN D3) 5,000 unit Tab tablet Commonly known as:  VITAMIN D3 Take  by mouth daily. cyanocobalamin 1,000 mcg tablet Take 1,000 mcg by mouth daily. losartan 25 mg tablet Commonly known as:  COZAAR Take  by mouth daily. MULTI VITAMIN PO Take  by mouth. omeprazole 20 mg capsule Commonly known as:  PRILOSEC Take 1 Cap by mouth two (2) times a day. Patient Instructions If you have any questions or concerns about today's appointment, the verbal and/or written instructions you were given for follow up care, please call our office at [phone]. [facility] - [address], [address] 
 
[phone] office [phone] Introducing [facility]! [facility] introduces [employer] patient portal. Now you can access parts of your medical record, email your doctor's office, and request medication refills online. 1. In your internet browser, go to [email or web address] 2. Click on the First Time User? Click Here link in the Sign In box. You will see the New Member Sign Up page. 3. Enter your [employer] Access Code exactly as it appears below. You will not need to use this code after youve completed the sign-up process. If you do not sign up before the expiration date, you must request a new code. · [employer] Access Code: [email or web address] Expires: 1/18/2018  2:23 PM 
 
4. Enter the last four digits of your Social Security Number (xxxx) and Date of Birth (mm/dd/yyyy) as indicated and click Submit. You will be taken to the next sign-up page. 5. Create a Bycler ID. This will be your By[employer] login ID and cannot be changed, so think of one that is secure and easy to remember. 6. Create a [employer] password. You can change your password at any time. 7. Enter your Password Reset Question and Answer. This can be used at a later time if you forget your password. 8. Enter your e-mail address. You will receive e-mail notification when new information is available in [address] 9. Click Sign Up. You can now view and download portions of your medical record. 10. Click the Download Summary menu link to download a portable copy of your medical information. If you have questions, please visit the Frequently Asked Questions section of the [employer] website. Remember, [employer] is NOT to be used for urgent needs. For medical emergencies, dial 911. Now available from your iPhone and Android! Please provide this summary of care documentation to your next provider. Your primary care clinician is listed as Doris Hogan. If you have any questions after today's visit, please call [phone].

## 2017-10-20 NOTE — PATIENT INSTRUCTIONS
If you have any questions or concerns about today's appointment, the verbal and/or written instructions you were given for follow up care, please call our office at [phone].     [facility] - [address]    [phone] office  [phone]

## 2018-05-29 ENCOUNTER — DOCUMENTATION ONLY (OUTPATIENT)
Dept: SURGERY | Age: 48
End: 2018-05-29

## 2018-05-29 NOTE — PROGRESS NOTES
[facility]  Letter sent requesting follow up appointment. [facility] [address]      Dear Keny Torres,  Your health is our main concern. It is important for your health to have follow-up lab work and to see you surgeon at 3 months, 6 months and annually after your weight loss surgery. Additionally, the Department of bariatric Surgery at our hospital is a member of the [address] [place] [address] Surgical Quality Improvement Program ([facility] NSQIP). As a participant in this program, we gather information on the outcomes of our patients after surgery. Please call the office for a follow up appointment at [phone] with GERDA Baxter. If you have moved out of the area or have changed surgeons please call us and let us know the name of your doctor. Your health and feedback are important to us. We greatly appreciate your response.        Thank you,  [facility] [address] [facility]

## 2018-06-26 ENCOUNTER — TELEPHONE (OUTPATIENT)
Dept: SURGERY | Age: 48
End: 2018-06-26

## 2018-06-26 ENCOUNTER — HOSPITAL ENCOUNTER (OUTPATIENT)
Dept: LAB | Age: 48
Discharge: HOME | End: 2018-06-26
Payer: COMMERCIAL

## 2018-06-26 DIAGNOSIS — E66.01 MORBID (SEVERE) OBESITY DUE TO EXCESS CALORIES (HCC): ICD-10-CM

## 2018-06-26 DIAGNOSIS — E66.01 MORBID (SEVERE) OBESITY DUE TO EXCESS CALORIES (HCC): Primary | ICD-10-CM

## 2018-06-26 LAB
ALBUMIN SERPL-MCNC: 3.6 G/DL (ref 3.4–5)
ALBUMIN/GLOB SERPL: 1 {RATIO} (ref 0.8–1.7)
ALP SERPL-CCNC: 57 U/L (ref 45–117)
ALT SERPL-CCNC: 26 U/L (ref 13–56)
ANION GAP SERPL CALC-SCNC: 9 MMOL/L (ref 3–18)
AST SERPL-CCNC: 16 U/L (ref 15–37)
BASOPHILS # BLD: 0 K/UL (ref 0–0.06)
BASOPHILS NFR BLD: 0 % (ref 0–2)
BILIRUB SERPL-MCNC: 0.3 MG/DL (ref 0.2–1)
BUN SERPL-MCNC: 20 MG/DL (ref 7–18)
BUN/CREAT SERPL: 28 (ref 12–20)
CALCIUM SERPL-MCNC: 8.5 MG/DL (ref 8.5–10.1)
CHLORIDE SERPL-SCNC: 104 MMOL/L (ref 100–108)
CHOLEST SERPL-MCNC: 168 MG/DL
CO2 SERPL-SCNC: 25 MMOL/L (ref 21–32)
CREAT SERPL-MCNC: 0.71 MG/DL (ref 0.6–1.3)
DIFFERENTIAL METHOD BLD: ABNORMAL
EOSINOPHIL # BLD: 0 K/UL (ref 0–0.4)
EOSINOPHIL NFR BLD: 0 % (ref 0–5)
ERYTHROCYTE [DISTWIDTH] IN BLOOD BY AUTOMATED COUNT: 14.1 % (ref 11.6–14.5)
FERRITIN SERPL-MCNC: 119 NG/ML (ref 8–388)
FOLATE SERPL-MCNC: >20 NG/ML (ref 3.1–17.5)
GLOBULIN SER CALC-MCNC: 3.6 G/DL (ref 2–4)
GLUCOSE SERPL-MCNC: 78 MG/DL (ref 74–99)
HCT VFR BLD AUTO: 37.9 % (ref 35–45)
HDLC SERPL-MCNC: 64 MG/DL (ref 40–60)
HDLC SERPL: 2.6 {RATIO} (ref 0–5)
HGB BLD-MCNC: 12.7 G/DL (ref 12–16)
IRON SERPL-MCNC: 67 UG/DL (ref 50–175)
LDLC SERPL CALC-MCNC: 92.2 MG/DL (ref 0–100)
LIPID PROFILE,FLP: ABNORMAL
LYMPHOCYTES # BLD: 2.5 K/UL (ref 0.9–3.6)
LYMPHOCYTES NFR BLD: 48 % (ref 21–52)
MCH RBC QN AUTO: 30.8 PG (ref 24–34)
MCHC RBC AUTO-ENTMCNC: 33.5 G/DL (ref 31–37)
MCV RBC AUTO: 92 FL (ref 74–97)
MONOCYTES # BLD: 0.3 K/UL (ref 0.05–1.2)
MONOCYTES NFR BLD: 6 % (ref 3–10)
NEUTS SEG # BLD: 2.4 K/UL (ref 1.8–8)
NEUTS SEG NFR BLD: 46 % (ref 40–73)
PLATELET # BLD AUTO: 183 K/UL (ref 135–420)
PMV BLD AUTO: 12 FL (ref 9.2–11.8)
POTASSIUM SERPL-SCNC: 3.5 MMOL/L (ref 3.5–5.5)
PROT SERPL-MCNC: 7.2 G/DL (ref 6.4–8.2)
RBC # BLD AUTO: 4.12 M/UL (ref 4.2–5.3)
SODIUM SERPL-SCNC: 138 MMOL/L (ref 136–145)
TRIGL SERPL-MCNC: 59 MG/DL (ref ?–150)
VIT B12 SERPL-MCNC: 1317 PG/ML (ref 211–911)
VLDLC SERPL CALC-MCNC: 11.8 MG/DL
WBC # BLD AUTO: 5.2 K/UL (ref 4.6–13.2)

## 2018-06-26 PROCEDURE — 82728 ASSAY OF FERRITIN: CPT | Performed by: SURGERY

## 2018-06-26 PROCEDURE — 80061 LIPID PANEL: CPT | Performed by: SURGERY

## 2018-06-26 PROCEDURE — 80053 COMPREHEN METABOLIC PANEL: CPT | Performed by: SURGERY

## 2018-06-26 PROCEDURE — 36415 COLL VENOUS BLD VENIPUNCTURE: CPT | Performed by: SURGERY

## 2018-06-26 PROCEDURE — 83540 ASSAY OF IRON: CPT | Performed by: SURGERY

## 2018-06-26 PROCEDURE — 82306 VITAMIN D 25 HYDROXY: CPT | Performed by: SURGERY

## 2018-06-26 PROCEDURE — 82607 VITAMIN B-12: CPT | Performed by: SURGERY

## 2018-06-26 PROCEDURE — 85025 COMPLETE CBC W/AUTO DIFF WBC: CPT | Performed by: SURGERY

## 2018-06-26 PROCEDURE — 84425 ASSAY OF VITAMIN B-1: CPT | Performed by: SURGERY

## 2018-06-26 NOTE — TELEPHONE ENCOUNTER
[facility] from hospital registration called and requested to have order for post op labs to be placed in patients chart. Labs ordered as requested.

## 2018-06-27 ENCOUNTER — DOCUMENTATION ONLY (OUTPATIENT)
Dept: SURGERY | Age: 48
End: 2018-06-27

## 2018-06-27 LAB — 25(OH)D3 SERPL-MCNC: 18.3 NG/ML (ref 30–100)

## 2018-06-27 NOTE — PROGRESS NOTES
Patient has been called and will be trying another Vitamin D3 since the one she is taking doesn't seem to work.

## 2018-06-29 LAB — VIT B1 BLD-SCNC: 130 NMOL/L (ref 66.5–200)

## 2018-07-05 ENCOUNTER — OFFICE VISIT (OUTPATIENT)
Dept: SURGERY | Age: 48
End: 2018-07-05

## 2018-07-05 VITALS
TEMPERATURE: 97 F | DIASTOLIC BLOOD PRESSURE: 91 MMHG | SYSTOLIC BLOOD PRESSURE: 141 MMHG | HEART RATE: 82 BPM | BODY MASS INDEX: 43.34 KG/M2 | HEIGHT: 59 IN | WEIGHT: 215 LBS | RESPIRATION RATE: 18 BRPM | OXYGEN SATURATION: 100 %

## 2018-07-05 DIAGNOSIS — K90.9 INTESTINAL MALABSORPTION, UNSPECIFIED TYPE: ICD-10-CM

## 2018-07-05 DIAGNOSIS — Z98.84 S/P LAPAROSCOPIC SLEEVE GASTRECTOMY: ICD-10-CM

## 2018-07-05 DIAGNOSIS — Z90.3 INTESTINAL MALABSORPTION FOLLOWING GASTRECTOMY: ICD-10-CM

## 2018-07-05 DIAGNOSIS — K91.2 INTESTINAL POSTOPERATIVE NONABSORPTION: Primary | ICD-10-CM

## 2018-07-05 DIAGNOSIS — K91.2 INTESTINAL MALABSORPTION FOLLOWING GASTRECTOMY: ICD-10-CM

## 2018-07-05 NOTE — PATIENT INSTRUCTIONS
If you have any questions or concerns about today's appointment, the verbal and/or written instructions you were given for follow up care, please call our office at [phone].     [facility] - [address]    [phone] office  [phone]

## 2018-07-05 NOTE — PROGRESS NOTES
1. Have you been to the ER, urgent care clinic since your last visit? Hospitalized since your last visit? No    2. Have you seen or consulted any other health care providers outside of the [address] [place] [address] since your last visit? Include any pap smears or colon screening. Yes Ortho     Patient presents for follow up from gastric sleeve on 6/14/17.

## 2018-07-05 NOTE — MR AVS SNAPSHOT
[address] [place] [phone] 
[phone] Patient: Cristian Teran MRN: [id number] :1970 Visit Information Date & Time Provider Department Dept. Phone Encounter #  
 2018  2:00 PM Nash Cai MD [facility] [phone] [id number] Upcoming Health Maintenance Date Due DTaP/Tdap/Td series (1 - Tdap) 1989 PAP AKA CERVICAL CYTOLOGY 1989 BREAST CANCER SCRN MAMMOGRAM 2018 FOBT Q 1 YEAR AGE 50-75 2018 Influenza Age 5 to Adult 2018 Allergies as of 2018  Review Complete On: 2018 By: Bren Eric LPN No Known Allergies Current Immunizations  Never Reviewed No immunizations on file. Not reviewed this visit You Were Diagnosed With   
  
 Codes Comments Intestinal malabsorption, unspecified type    -  Primary ICD-10-CM: K90.9 ICD-9-CM: 579.9 Vitals BP Pulse Temp Resp Height(growth percentile) Weight(growth percentile) (!) 141/91 82 97 °F (36.1 °C) (Oral) 18 4' 11\" (1.499 m) 215 lb (97.5 kg) SpO2 BMI OB Status Smoking Status 100% 43.42 kg/m2 Hysterectomy Former Smoker Vitals History BMI and BSA Data Body Mass Index Body Surface Area  
 43.42 kg/m 2 2.01 m 2 Preferred Pharmacy Pharmacy Name Phone [id number]- [address] [phone] Your Updated Medication List  
  
   
This list is accurate as of 18  2:54 PM.  Always use your most recent med list.  
  
  
  
  
 CALCIUM 600 + D 600-125 mg-unit Tab Generic drug:  calcium-cholecalciferol (d3) Take  by mouth. cholecalciferol (VITAMIN D3) 5,000 unit Tab tablet Commonly known as:  VITAMIN D3 Take 2,000 Units by mouth daily. cyanocobalamin 1,000 mcg tablet Take 1,000 mcg by mouth daily. losartan 25 mg tablet Commonly known as:  COZAAR Take  by mouth daily. MULTI VITAMIN PO Take  by mouth. omeprazole 20 mg capsule Commonly known as:  PRILOSEC Take 1 Cap by mouth two (2) times a day. To-Do List   
 07/05/2018 Lab:  VITAMIN D, 25 HYDROXY Patient Instructions If you have any questions or concerns about today's appointment, the verbal and/or written instructions you were given for follow up care, please call our office at [phone]. [facility] - [address], [place] [phone] office [phone] Introducing [address]! [place] introduces [employer] patient portal. Now you can access parts of your medical record, email your doctor's office, and request medication refills online. 1. In your internet browser, go to [email or web address] 2. Click on the First Time User? Click Here link in the Sign In box. You will see the New Member Sign Up page. 3. Enter your [employer] Access Code exactly as it appears below. You will not need to use this code after youve completed the sign-up process. If you do not sign up before the expiration date, you must request a new code. · [employer] Access Code: [email or web address] Expires: 10/3/2018  1:45 PM 
 
4. Enter the last four digits of your Social Security Number (xxxx) and Date of Birth (mm/dd/yyyy) as indicated and click Submit. You will be taken to the next sign-up page. 5. Create a Neronotet ID. This will be your [employer] login ID and cannot be changed, so think of one that is secure and easy to remember. 6. Create a [employer] password. You can change your password at any time. 7. Enter your Password Reset Question and Answer. This can be used at a later time if you forget your password. 8. Enter your e-mail address. You will receive e-mail notification when new information is available in [address] 9. Click Sign Up. You can now view and download portions of your medical record. 10. Click the Download Summary menu link to download a portable copy of your medical information. If you have questions, please visit the Frequently Asked Questions section of the [employer] website. Remember, [employer] is NOT to be used for urgent needs. For medical emergencies, dial 911. Now available from your iPhone and Android! Please provide this summary of care documentation to your next provider. Your primary care clinician is listed as Loraine Schrader. If you have any questions after today's visit, please call [phone].

## 2018-07-05 NOTE — PROGRESS NOTES
Subjective:      Marie Velasco is a 46 y.o. female is now 1 years status post laparoscopic sleeve gastrectomy. She notes she is very stressed. She is about to have a hip replaced and her surgeon wants both replaced and her knee. She is also having significant marital distress. She notes she is very lonely and feels she needs more partnership. She notes that she is not losing as much weight as she wanted to. She is now 215lbs. She was 260 on the day of surgery (280 at initial consult). She eats cheese, nuts, deli meat, chicken, in addition to 2 protein shakes a day. She drinks 64oz of water daily. She also supplements her protein with cucumber and tomatos. She had a burger (1/2) yesterday and tolerated it well. She is avoiding snacks for the most part, but has occasional chips. Her exercise is limited due to pain in joints, but she is doing water aerobics 4x a week. Bowel movements are constipated. The patient is compliant with multivitamins, calcium, Vit D and B12 supplements. Weight Loss Metrics 7/5/2018 7/5/2018 10/20/2017 6/28/2017 6/28/2017 6/14/2017 5/25/2017   Pre op / Initial Wt 281 - - 260 - - 260   Today's Wt - 215 lb 224 lb - 243 lb 260 lb -   BMI - 43.42 kg/m2 45.24 kg/m2 - 49.08 kg/m2 52.51 kg/m2 -   Ideal Body Wt 117 - - 117 - - 117   Excess Body Wt 164 - - 143 - - 143   Goal Wt - - - 150 - - 145. 6   Wt loss to date 66 - - 17 - - 0   % Wt Loss 0.5 - - 0.15 - - 0   80% .2 - - 114.4 - - 114.4       Body mass index is 43.42 kg/(m^2).     Comorbidities:    Hypertension: unchanged  Diabetes: not applicable  Obstructive Sleep Apnea: not applicable  Hyperlipidemia: not applicable  Stress Urinary Incontinence: not applicable  Gastroesophageal Reflux: not applicable  Weight related arthropathy:not applicable        Past Medical History:   Diagnosis Date    Hypertension     Morbid obesity ([address]     Musculoskeletal disorder     Osteoarthritis     Right hip pain Past Surgical History:   Procedure Laterality Date    HX  SECTION      HX CHOLECYSTECTOMY      HX GI  2017    Vertical sleeve gastrectomy    HX GYN      HX HYSTERECTOMY         Current Outpatient Prescriptions   Medication Sig Dispense Refill    calcium-cholecalciferol, d3, (CALCIUM 600 + D) 600-125 mg-unit tab Take  by mouth.  cyanocobalamin 1,000 mcg tablet Take 1,000 mcg by mouth daily.  MULTIVIT-MINERALS/FERROUS FUM (MULTI VITAMIN PO) Take  by mouth.  omeprazole (PRILOSEC) 20 mg capsule Take 1 Cap by mouth two (2) times a day. 60 Cap 2    losartan (COZAAR) 25 mg tablet Take  by mouth daily.  cholecalciferol, VITAMIN D3, (VITAMIN D3) 5,000 unit tab tablet Take 2,000 Units by mouth daily. No Known Allergies      Objective:     Visit Vitals    BP (!) 141/91    Pulse 82    Temp 97 °F (36.1 °C) (Oral)    Resp 18    Ht 4' 11\" (1.499 m)    Wt 97.5 kg (215 lb)    SpO2 100%    BMI 43.42 kg/m2       General:  alert, cooperative, no distress, appears stated age   Chest: lungs clear to auscultation, breath sounds equal and symmetric, no accessory muscle use   Cor:   Regular rate and rhythm   Abdomen: soft, bowel sounds active, non-tender   Incisions:   healing well, no drainage, no erythema, no hernia, no seroma, no swelling, no dehiscence, incision well approximated     Labs: reviewed,  Grossly normal, Vit D low (directed in supplementation)    Assessment:     Doing well postoperatively. Discussed cutting down to 1 shake a day, limiting cheese and nuts to occasional snacks, rely/ing more on lean protein such as turkey/ chicken and eggs.     Plan:   -recheck Vit D 3mo  Increase activity to the goal of 30 minutes daily, Follow up labs as ordered, Increase fluids, Follow up with Registered Dietician and Continue MVI/Ca/B12 supplementation  Encouraged to attend support group  Follow up in 6 months

## 2018-12-07 ENCOUNTER — TELEPHONE (OUTPATIENT)
Dept: SURGERY | Age: 48
End: 2018-12-07

## 2019-05-02 ENCOUNTER — DOCUMENTATION ONLY (OUTPATIENT)
Dept: SURGERY | Age: 49
End: 2019-05-02

## 2020-01-17 NOTE — PROGRESS NOTES
Assessment/Plan:       Diagnoses and all orders for this visit:    Postural kyphosis of thoracolumbar region    Other orders  -     CRANBERRY SOFT PO; Take by mouth                Subjective:      Patient ID: Christy Barrera is a 78 y o  female  This 49-year-old female patient who is quite functional was scratching her back and noted a "lump "  She comes in concerned about this  What she noted is the spinae process of L for lumbar vertebra which is prominent because the patient has kyphosis and walks a bit stooped over  This is normal human anatomy  No symptoms referable otherwise  No back pain  No palpatory tenderness or warmth      The following portions of the patient's history were reviewed and updated as appropriate:   She has a past medical history of Alopecia, Cardiac arrhythmia, Compression fracture of L1 lumbar vertebra ([address] ), and Occlusion of carotid artery  ,  does not have any pertinent problems on file  ,   has a past surgical history that includes Appendectomy; Thromboendarterectomy (Left); Colonoscopy; Esophagogastroduodenoscopy; Tonsillectomy; Back surgery; and Breast biopsy (Right)  ,  family history includes No Known Problems in her daughter, daughter, maternal grandmother, mother, paternal grandmother, and sister  ,   reports that she has never smoked  She has never used smokeless tobacco  She reports that she drank alcohol  She reports that she does not use drugs  ,  is allergic to myrbetriq [mirabegron]; latex; barium sulfate; and penicillins     Current Outpatient Medications   Medication Sig Dispense Refill    alendronate (FOSAMAX) 35 mg tablet Take 1 tablet (35 mg total) by mouth every 7 days 12 tablet 3    atenolol (TENORMIN) 25 mg tablet Take 1 tablet (25 mg total) by mouth daily 90 tablet 3    atorvastatin (LIPITOR) 40 mg tablet Take 1 tablet (40 mg total) by mouth daily 90 tablet 3    Calcium Carbonate-Vitamin D3 600-400 MG-UNIT TABS 1 tab daily      CRANBERRY SOFT PO Take by Subjective:      Leslie Lino is a 50 y.o. female is now 4 months status post laparoscopic sleeve gastrectomy. Doing well overall. Currently on a stage 4 diet without difficulty. Taking in 48+oz water daily. Sources of protein include chicken and salmon. She is doing water aerobics and walking minimum of 4 days a week. Bowel movements are constipated. The patient is not having any pain. The patient is compliant with multivitamins, calcium, Vit D and B12 supplements. Weight Loss Metrics 10/20/2017 2017 2017 2017 2017 2017 2017   Pre op / Initial Wt - 260 - - 260 - -   Today's Wt 224 lb - 243 lb 260 lb - 260 lb 261 lb   BMI 45.24 kg/m2 - 49.08 kg/m2 52.51 kg/m2 - 52.51 kg/m2 52.72 kg/m2   Ideal Body Wt - 117 - - 117 - -   Excess Body Wt - 143 - - 143 - -   Goal Wt - 150 - - 145.6 - -   Wt loss to date - 17 - - 0 - -   % Wt Loss - 0.15 - - 0 - -   80% EBW - 114.4 - - 114.4 - -       Body mass index is 45.24 kg/(m^2). Comorbidities:    Hypertension: resolved  Diabetes: not applicable  Obstructive Sleep Apnea: not applicable  Hyperlipidemia: not applicable  Stress Urinary Incontinence: not applicable  Gastroesophageal Reflux: resolved  Weight related arthropathy:improved        Past Medical History:   Diagnosis Date    Hypertension     Morbid obesity ([address]     Musculoskeletal disorder     Osteoarthritis     Right hip pain        Past Surgical History:   Procedure Laterality Date    HX  SECTION      HX CHOLECYSTECTOMY      HX GI  2017    Vertical sleeve gastrectomy    HX GYN      HX HYSTERECTOMY         Current Outpatient Prescriptions   Medication Sig Dispense Refill    calcium-cholecalciferol, d3, (CALCIUM 600 + D) 600-125 mg-unit tab Take  by mouth.  cyanocobalamin 1,000 mcg tablet Take 1,000 mcg by mouth daily.  MULTIVIT-MINERALS/FERROUS FUM (MULTI VITAMIN PO) Take  by mouth.       omeprazole (PRILOSEC) 20 mg capsule Take 1 Cap by mouth      estradiol (ESTRACE VAGINAL) 0 1 mg/g vaginal cream       lubiprostone (AMITIZA) 24 mcg capsule Take 1 capsule (24 mcg total) by mouth 2 (two) times a day with meals 180 capsule 3    multivitamin (THERAGRAN) TABS Take 1 tablet by mouth      nystatin-triamcinolone (MYCOLOG-II) ointment        No current facility-administered medications for this visit  Review of Systems   Constitutional: Negative for chills and fever  Musculoskeletal: Positive for arthralgias  Negative for back pain  Objective:  Vitals:    01/17/20 1048   BP: 140/82   Pulse: 75   SpO2: 97%      Physical Exam   Constitutional:    An older female patient who appears to be stated age   Musculoskeletal: She exhibits deformity  Hyper kyphosis noted  Lumbar vertebra 4  Spinae process is prominent and palpable  The area is not tender  There is no associated problem of the skin  No warmth or redness  Neurological: She is alert           Patient Instructions    Normal anatomy in an 75-year-old female with exaggerated kyphosis mouth two (2) times a day. 60 Cap 2    losartan (COZAAR) 25 mg tablet Take  by mouth daily.  cholecalciferol, VITAMIN D3, (VITAMIN D3) 5,000 unit tab tablet Take  by mouth daily.  enoxaparin (LOVENOX) 40 mg/0.4 mL 0.4 mL by SubCUTAneous route daily. 7 Syringe 0    hyoscyamine sulfate (CYSTOSPAZ) 0.125 mg tablet 1 Tab by SubLINGual route every six (6) hours as needed for Other (Spasms). 30 Tab 0    ondansetron (ZOFRAN ODT) 4 mg disintegrating tablet Take 1 Tab by mouth every six (6) hours as needed. 30 Tab 0    oxyCODONE IR (ROXICODONE) 5 mg immediate release tablet Take 1 Tab by mouth every six (6) hours as needed. Max Daily Amount: 20 mg. 30 Tab 0       No Known Allergies      Objective:     Visit Vitals    /89    Pulse 76    Temp 98.4 °F (36.9 °C) (Oral)    Resp 18    Ht 4' 11\" (1.499 m)    Wt 101.6 kg (224 lb)    LMP  (Approximate)    SpO2 99%    BMI 45.24 kg/m2       General:  alert, cooperative, no distress, appears stated age   Chest: lungs clear to auscultation, no accessory muscle use   Cor:   Regular rate and rhythm   Abdomen: soft, bowel sounds active, non-tender   Incisions:   healing well, no drainage, no erythema, no hernia, no seroma, no swelling, no dehiscence, incision well approximated       Labs: reviewed, grossly normal    Assessment:     Doing well postoperatively. GERD has resolved. Feeling well. Has lots of energy  Constipation is likely a result of not drinking enough water. Discussed increasing water to 60oz daily and increasing Miralax.   Plan:     Increase activity to the goal of 30 minutes daily, Follow up labs as ordered, Increase fluids, Follow up with Registered Dietician and Continue MVI/Ca/B12 supplementation  Encouraged to attend support group  Follow up in 3 months

## 2020-04-06 ENCOUNTER — HOSPITAL ENCOUNTER (OUTPATIENT)
Dept: PHYSICAL THERAPY | Age: 50
Discharge: HOME | End: 2020-04-06
Payer: COMMERCIAL

## 2020-04-06 PROCEDURE — 97530 THERAPEUTIC ACTIVITIES: CPT

## 2020-04-06 PROCEDURE — 97110 THERAPEUTIC EXERCISES: CPT

## 2020-04-06 PROCEDURE — 97162 PT EVAL MOD COMPLEX 30 MIN: CPT

## 2020-04-06 NOTE — PROGRESS NOTES
[address]ors [address]  [facility] PHYSICAL THERAPY  [address] [facility], [address] - Phone: [phone]  Fax: [phone] / [address]  Patient Name: Chele Benson : 1970   Medical   Diagnosis: Left knee pain [M25.562] Treatment Diagnosis: Bilateral knee OA   Onset Date: Chronic     Referral Source: Tonya Essex, MD Start of Care [facility]): 2020   Prior Hospitalization: See medical history Provider #: [id number]   Prior Level of Function: On disability, minimal community navigation   Comorbidities: HTN, bilateral lateral approach AIDA , gastric sleeve 2017, obese   Medications: Verified on Patient Summary List   The Plan of Care and following information is based on the information from the initial evaluation.   ===========================================================================================  Assessment / key information:  Chele Benson is a 44 y.o.  female with Dx: Left knee pain [M25.562], signs and symptoms consistent with bilateral mechanical knee pain. Pt reports to PT with reports of chronic bilateral knee pain, LBP with right LE radiculopathy and cervicalgia for the past ten years. The pt was treated for LBP at a [facility] clinic roughly one year ago and reports that PT was effective at managing her symptoms until discharged. The pt reports prolonged sitting as an aggravating factor for her LBP and prolonged WB as an aggravating factor for her knee pain (ten minut  e tolerance.) The pt does not have stairs in her home, but is very challenged with stair negotiation in the community. Objective:  The pt demonstrates the following functional deficits: 1) severe hip girdle, knee and plantar flexion MMT deficits detailed below, 2) severe dorsi flexion AROM deficits contributing to knee hyperextension and associated knee pain with standing and ambulation, 3) decreased gait speed of 0.5 m/s on the 10 MWT. ROM / Strength  []? Unable to assess                  AROM                      PROM                   Strength (1-5)      Left Right Left Right Left Right   Hip Flexion 40 40 52 58 2- 2     Abduction 30 30 30 32 3 3     Extension 10 15 18 18 4 4     ER/IR 5/35 5/35 15/38 15/38 3 3   Knee Flexion 126 118 128 120 4- 4-     Extension 15 hyper 10 hyper - - 3 3   Ankle Plantarflexion 50 50 55 55 3 3     Dorsiflexion -10 -10 -8 -5 4 4     The patient's FOTO score of 39 points indicates 61% limitation in functional ability. The patient would benefit from skilled PT to address the below listed impairments.  Thank you for your referral.  ===========================================================================================  Eval Complexity: History: HIGH Complexity :3+ comorbidities / personal factors will impact the outcome/ POC Exam:HIGH Complexity : 4+ Standardized tests and measures addressing body structure, function, activity limitation and / or participation in recreation  Presentation: MEDIUM Complexity : Evolving with changing characteristics  Clinical Decision Making:MEDIUM Complexity : FOTO score of 26-74Overall Complexity:MEDIUM    Problem List: pain affecting function, decrease ROM, decrease strength, edema affecting function, impaired gait/ balance, decrease ADL/ functional abilitiies, decrease activity tolerance, decrease flexibility/ joint mobility and decrease transfer abilities   Treatment Plan may include any combination of the following: Therapeutic exercise, Therapeutic activities, Neuromuscular re-education, Physical agent/modality, Gait/balance training, Manual therapy, Aquatic therapy, Patient education, Self Care training, Functional mobility training, Home safety training and Stair training    Patient / Family readiness to learn indicated by: asking questions, trying to perform skills and interest  Persons(s) to be included in education: patient (P)  Barriers to Learning/Limitations: None  Measures taken: na   Patient Goal (s): \"To be able to bend forward to help with housecleaning. \"   Patient self reported health status: fair  Rehabilitation Potential: good   Short Term Goals: To be accomplished in  2-3  weeks:  1. Patient will demonstrate compliance with HEP for symptom management at home. 2. Patient will demonstrate at least 0 degrees of dorsi flexion AROM bilaterally to decrease knee hyperextension with ambulation. 3. Patient will demonstrate 90 degrees of hip flexion AROM bilaterally to increase efficiency with car transfers.  Long Term Goals: To be accomplished in  4-6  weeks:  1. Patient will be independent with HEP to self-manage and prevent symptoms upon DC. 2.  Patient will improve FOTO score to at least 55 points to indicate improved functional status. 3.  Patient will demonstrate at least 120 degrees of knee flexion AROM bilaterally to improve efficiency with stair negotiation. 4.  Patient will ascend and descend 12 steps in a reciprocal pattern and max one UE assist to improve efficiency with community navigation. Frequency / Duration:   Patient to be seen  23-  times per week for 4-6  weeks:  Patient / Caregiver education and instruction: self care, activity modification and exercises      Therapist Signature: Nury Reynolds DPT Date: 9/2/3549   Certification Period: NA Time: 3:26 PM   ===========================================================================================  I certify that the above Physical Therapy Services are being furnished while the patient is under my care. I agree with the treatment plan and certify that this therapy is necessary. Physician Signature:        Date:       Time:     Please sign and return to [facility] [employer] or you may fax the signed copy to [phone]. Thank you.

## 2020-04-06 NOTE — PROGRESS NOTES
PHYSICAL THERAPY - DAILY TREATMENT NOTE    Patient Name: Alexis Restrepo        Date: 2020  : 1970   YES Patient  Verified  Visit #:   1     Insurance: Payor: Laura Pat / Plan: Jazzy LANGE / Product Type: Commerical /      In time: 9:30 Out time: 10\"35   Total Treatment Time: 65     Medicare/BCBS Time Tracking (below)   Total Timed Codes (min):  NA 1:1 Treatment Time:  NA     TREATMENT AREA =  Knee    SUBJECTIVE    Pain Level (on 0 to 10 scale):  6  / 10   Medication Changes/New allergies or changes in medical history, any new surgeries or procedures? YES    If yes, update Summary List   Subjective Functional Status/Changes:  []  No changes reported     History of Condition: Pt reports to PT with reports of chronic bilateral knee pain, LBP with right LE radiculopathy and cervicalgia for the past ten years. The pt was treated for LBP at a [facility] clinic roughly one year ago and reports that PT was effective at managing her symptoms until discharged. The pt reports prolonged sitting as an aggravating factor for her LBP and prolonged WB as an aggravating factor for her knee pain (ten minute tolerance.) The pt does not have stairs in her home, but is very challenged with stair negotiation in the community. Aggravating Factors: \    Alleviating Factors: \    Previous Treatment: \    PMHx:see chart    Social/Recreational/Work: on disability    Pt Goals: \"To be able to bend forward to help with housecleaning. \"    FOTO: 39    Typical /90         OBJECTIVE  Physical Therapy Evaluation - Knee    Gait:  [] Normal    [] Abnormal    [] Antalgic    [] NWB    Device:     Describe:     ROM / Strength  [] Unable to assess                  AROM                      PROM                   Strength (1-5)    Left Right Left Right Left Right   Hip Flexion 40 40 52 58 2- 2    Abduction 30 30 30 32 3 3    Extension 10 15 18 18 4 4    ER/IR /35 5/35 15/38 15/38 3 3   Knee Flexion 126 118 128 120 4- 4-    Extension 15 hyper 10 hyper - - 3 3   Ankle Plantarflexion 50 50 55 55 3 3    Dorsiflexion -10 -10 -8 -5 4 4       Flexibility: [] Unable to assess at this time  Hamstrings:    (L) Tightness= [] WNL   [] Min   [x] Mod   [] Severe    (R) Tightness= [] WNL   [] Min   [x] Mod   [] Severe  Quadriceps:    (L) Tightness= [x] WNL   [] Min   [] Mod   [] Severe    (R) Tightness= [x] WNL   [] Min   [] Mod   [] Severe  Gastroc:      (L) Tightness= [] WNL   [] Min   [] Mod   [x] Severe    (R) Tightness= [] WNL   [] Min   [] Mod   [x] Severe  Other:    Palpation:   Neg/Pos  Neg/Pos  Neg/Pos   Joint Line  Quad tendon  Patellar ligament    Patella  Fibular head  Pes Anserinus    Tibial tubercle  Hamstring tendons  Infrapatellar fat pad      Optional Tests:   Patellar Positioning (Static)   []L []R Normal []L []R Lateral   [x]L [x]R Xin Halsey      []L []R Medial   []L []R [place]    Patellar Tracking   []L []R Glide (Lat)   []L []R Tilt (Lat)     []L []R Glide (Med)  []L []R Tilt (Med)      []L []R Tile (Inf)     Patellar Mobility   [x]L [x]R Hypermobile []L []R Hypomobile         Girth Measurements in cm:      4 in above midpatella   2 in above midpatella  at  midpatella  2 in below midpatella   4 in below midpatella   Left        Right           Lachmans  [] Neg    [] Pos Posterior Drawer [] Neg    [] Pos  Pivot Shift  [] Neg    [] Pos Posterior Sag  [] Neg    [] Pos  Wendi's Test [] Neg    [] Pos Vicente's Test  [] Neg    [] Pos  Squat   [] Neg    [] Pos          Ely's Test             [] Neg    [] Pos  Valgus@ 0 Degrees [] Neg    [] Pos Silvestre [] Neg    [] Pos  Valgus@ 30 Degrees [] Neg    [] Pos Patellar Apprehension[] Neg    [] Pos  Varus@ 0 Degrees [] Neg    [] Pos Apley's Compression [] Neg    [] Pos  Varus@ 30 Degrees [] Neg    [] Pos Apley's Distraction [] Neg    [] Pos  Anterior Drawer [] Neg    [] Pos Other:                  [] Neg    [] Pos               Modalities Rationale:    To improve activity tolerance for exercise performance and ADL's.      min [] Estim, type/location:                                      []  att     []  unatt     []  w/US     []  w/ice    []  w/heat    min []  Mechanical Traction: type/lbs                   []  pro   []  sup   []  int   []  cont    []  before manual    []  after manual    min []  Ultrasound, settings/location:      min []  Iontophoresis w/ dexamethasone, location:                                               []  take home patch       []  in clinic    min []  Ice     []  Heat    location/position:     min []  Vasopneumatic Device, press/temp:     min []  Other:    [] Skin assessment post-treatment (if applicable):    [x]  intact    []  redness- no adverse reaction     []redness  adverse reaction:      25 min Therapeutic Exercise:  [x]  See flow sheet   Rationale:      increase ROM and increase strength to improve the patients ability to perform ADL\"s with improved hip girdle stability. 10 min Therapeutic Activity: Lifting mechanics, gait training   Rationale: To increase safety and efficiency with  ADL's.     min Patient Education:  YES  Reviewed HEP   []  Progressed/Changed HEP based on:   HEP issued      Other Objective/Functional Measures:     Ankle Inversion AROM/PROM: 30/38 right 40/42 left  T[place]ace [place] /PROM: 10/10 bilateral    C/S AROM/PROM  Flexion 50/50  Extension 38/40  Lateral Flexion 38/40 right, 30/33 left  Rotation 70/72 right, 52/55 left     Post Treatment Pain Level (on 0 to 10) scale:   6  / 10     ASSESSMENT    Assessment/Changes in Function:     Justification for Eval Code Complexity: MODERATE  Patient History (low 0, mod 1-2, high 3-4): HTN, bilateral lateral approach AIDA 2019, gastric sleeve 2017, obese HIGH   Examination (low 1-2, mod 3+, high 4+): C/S AROM, L/S AROM and repeated motions, LE AROM, LE MMT HIGH   Clinical Presentation (low: stable/uncomplicated; mod: evolving; high: unstable/unpredictable): evolving symptoms with exercise MODERATE  Clinical Decision Making (low , mod 26-74, high 1-25): 39 MODERATE     []  See Progress Note/Recertification   Patient will continue to benefit from skilled PT services to modify and progress therapeutic interventions, address functional mobility deficits, address ROM deficits, address strength deficits, analyze and address soft tissue restrictions, analyze and cue movement patterns, analyze and modify body mechanics/ergonomics and assess and modify postural abnormalities to attain remaining goals.    Progress toward goals / Updated goals:    Goals established, See PoC     PLAN    [x]  Upgrade activities as tolerated YES Continue plan of care   []  Discharge due to :    [x]  Other: Initiate PoC     Therapist: Nury Reynolds    Date: 4/6/2020 Time: 9:38 AM

## 2020-04-08 ENCOUNTER — HOSPITAL ENCOUNTER (OUTPATIENT)
Dept: PHYSICAL THERAPY | Age: 50
Discharge: HOME | End: 2020-04-08
Payer: COMMERCIAL

## 2020-04-13 ENCOUNTER — HOSPITAL ENCOUNTER (OUTPATIENT)
Dept: PHYSICAL THERAPY | Age: 50
Discharge: HOME | End: 2020-04-13
Payer: COMMERCIAL

## 2020-04-13 PROCEDURE — 97110 THERAPEUTIC EXERCISES: CPT

## 2020-04-13 PROCEDURE — 97530 THERAPEUTIC ACTIVITIES: CPT

## 2020-04-15 ENCOUNTER — APPOINTMENT (OUTPATIENT)
Dept: PHYSICAL THERAPY | Age: 50
End: 2020-04-15
Payer: COMMERCIAL

## 2020-04-24 ENCOUNTER — APPOINTMENT (OUTPATIENT)
Dept: PHYSICAL THERAPY | Age: 50
End: 2020-04-24
Payer: COMMERCIAL

## 2020-05-06 ENCOUNTER — DOCUMENTATION ONLY (OUTPATIENT)
Dept: SURGERY | Age: 50
End: 2020-05-06

## 2020-06-03 NOTE — PROGRESS NOTES
[address] Connors [address]  [facility] PHYSICAL THERAPY  [address] [facility], [address] - Phone: [phone]  Fax: [phone] SUMMARY FOR PHYSICAL THERAPY          Patient Name: John Wolff : 1970   Treatment/Medical Diagnosis: Left knee pain [M25.562]   Onset Date: Chronic    Referral Source: Rafat Portillo MD [facility]): 2020   Prior Hospitalization: NA Provider #: [id number]   Prior Level of Function: On disability, minimal community navigation   Comorbidities: HTN, bilateral lateral approach AIDA 2019, gastric sleeve 2017, obese   Medications: Verified on Patient Summary List   Visits from [facility]: 2 Missed Visits: 2       Key Functional Changes/Progress: Under your discretion this pt was evaluated and seen for Physical Therapy at our clinic. Due to COVID-19 your patient was placed on hold and has not been seen since 2020. At that time they had completed x2 visits. We recently reached out to our patients who were placed on hold to inquire regarding their desire for continued physical therapy and the patient was unable to be reached. We informed they patient via voicemail what she would be D/Cd and to return to therapy they will simply need a new prescription. We appreciate the kind referral and would willingly work with this patient again should they need continued services. Your patient's health is our primary concern. Assessments/Recommendations: Other: Per pt request    If you have any questions/comments please contact us directly at [phone]. Thank you for allowing us to assist in the care of your patient. Therapist Signature:   Rola Ruffin DPT Date: 6/3/2020   Reporting Period: NA Time: 71:54 PM      Certification Period: NA

## (undated) DEVICE — STERILE POLYISOPRENE POWDER-FREE SURGICAL GLOVES: Brand: PROTEXIS

## (undated) DEVICE — VISUALIZATION SYSTEM: Brand: CLEARIFY

## (undated) DEVICE — RELOAD STPL H41XL60MM GRN THCK B FORM NAT ARTC ECHELON

## (undated) DEVICE — BAG SPEC REM 224ML W4XL6IN DIA10MM 1 HND GYN DISP ENDOPCH

## (undated) DEVICE — DISPOSABLE SUCTION/IRRIGATOR TUBE SET WITH TIP: Brand: AHTO

## (undated) DEVICE — SHEARS: Brand: ENDO SHEARS

## (undated) DEVICE — SUTURE MCRYL SZ 4-0 L27IN ABSRB UD L24MM PS-1 3/8 CIR PRIM Y935H

## (undated) DEVICE — TRUE CONTENT TO BE POPULATED AS PART OF REBRANDING: Brand: ARGYLE

## (undated) DEVICE — UNIVERSAL FIXATION CANNULA: Brand: VERSAPORT

## (undated) DEVICE — SLEEVE COMPR STD 12 IN FOR 165IN CALF COMFORT VENODYNE SYS

## (undated) DEVICE — PACK PROCEDURE SURG LAPAROSCOPY 17X7 MM BRTRC PRIMUS

## (undated) DEVICE — KENDALL SCD EXPRESS SLEEVES, KNEE LENGTH, MEDIUM: Brand: KENDALL SCD

## (undated) DEVICE — TROCAR ENDOSCP L100MM DIA12MM STBL SL BLDELSS ENDOPATH XCEL

## (undated) DEVICE — LIGHT HANDLE: Brand: DEVON

## (undated) DEVICE — STAPLER SKIN L440MM 32MM LNG 12 FIRING B FRM PWR + GRIPPING

## (undated) DEVICE — SOLUTION SCRB 4OZ 4% CHG CLN BASE FOR PT SKIN ANTISEPSIS

## (undated) DEVICE — SUT SLK 2-0SH 30IN BLK --

## (undated) DEVICE — INTENDED FOR TISSUE SEPARATION, AND OTHER PROCEDURES THAT REQUIRE A SHARP SURGICAL BLADE TO PUNCTURE OR CUT.: Brand: BARD-PARKER ® CARBON RIB-BACK BLADES

## (undated) DEVICE — SUTURE VCRL SZ 2-0 L54IN ABSRB VLT W/O NDL POLYGLACTIN 910 J618H

## (undated) DEVICE — RELOAD STPL H35XL60MM BLU REG B FORM NAT ARTC ECHELON

## (undated) DEVICE — KIT CATH OD16FR 5ML BLLN SIL URIN INDWL STR TIP INF CTRL

## (undated) DEVICE — STAPLE INT WHT BLU G GRN BLK REINF FOR ENDOPATH ECHELON FLX

## (undated) DEVICE — TROCAR LAP L100MM DIA5MM BLDELSS W/ STBL SL ENDOPATH XCEL

## (undated) DEVICE — STERILE POLYISOPRENE POWDER-FREE SURGICAL GLOVES WITH EMOLLIENT COATING: Brand: PROTEXIS